# Patient Record
Sex: MALE | Race: WHITE | Employment: FULL TIME | URBAN - METROPOLITAN AREA
[De-identification: names, ages, dates, MRNs, and addresses within clinical notes are randomized per-mention and may not be internally consistent; named-entity substitution may affect disease eponyms.]

---

## 2017-07-19 ENCOUNTER — HOSPITAL ENCOUNTER (INPATIENT)
Age: 62
LOS: 5 days | Discharge: HOME | DRG: 690 | End: 2017-07-24
Attending: INTERNAL MEDICINE | Admitting: INTERNAL MEDICINE
Payer: COMMERCIAL

## 2017-07-19 PROBLEM — N20.0 KIDNEY STONES: Status: ACTIVE | Noted: 2017-07-19

## 2017-07-19 PROBLEM — E11.9 DIABETES MELLITUS TYPE 2, CONTROLLED (HCC): Status: ACTIVE | Noted: 2017-07-19

## 2017-07-19 PROBLEM — N20.0 RENAL STONES: Status: ACTIVE | Noted: 2017-07-19

## 2017-07-19 LAB — GLUCOSE BLD STRIP.AUTO-MCNC: 148 MG/DL (ref 70–110)

## 2017-07-19 PROCEDURE — 65270000029 HC RM PRIVATE

## 2017-07-19 PROCEDURE — 74011250636 HC RX REV CODE- 250/636: Performed by: INTERNAL MEDICINE

## 2017-07-19 PROCEDURE — 82962 GLUCOSE BLOOD TEST: CPT

## 2017-07-19 RX ORDER — NALOXONE HYDROCHLORIDE 0.4 MG/ML
0.4 INJECTION, SOLUTION INTRAMUSCULAR; INTRAVENOUS; SUBCUTANEOUS AS NEEDED
Status: DISCONTINUED | OUTPATIENT
Start: 2017-07-19 | End: 2017-07-24 | Stop reason: HOSPADM

## 2017-07-19 RX ORDER — INSULIN LISPRO 100 [IU]/ML
INJECTION, SOLUTION INTRAVENOUS; SUBCUTANEOUS
Status: DISCONTINUED | OUTPATIENT
Start: 2017-07-20 | End: 2017-07-24 | Stop reason: HOSPADM

## 2017-07-19 RX ORDER — ONDANSETRON 2 MG/ML
4 INJECTION INTRAMUSCULAR; INTRAVENOUS
Status: DISCONTINUED | OUTPATIENT
Start: 2017-07-19 | End: 2017-07-20 | Stop reason: SDUPTHER

## 2017-07-19 RX ORDER — HYDROMORPHONE HYDROCHLORIDE 1 MG/ML
0.5 INJECTION, SOLUTION INTRAMUSCULAR; INTRAVENOUS; SUBCUTANEOUS
Status: DISCONTINUED | OUTPATIENT
Start: 2017-07-19 | End: 2017-07-24 | Stop reason: HOSPADM

## 2017-07-19 RX ORDER — SODIUM CHLORIDE 9 MG/ML
75 INJECTION, SOLUTION INTRAVENOUS CONTINUOUS
Status: DISPENSED | OUTPATIENT
Start: 2017-07-20 | End: 2017-07-23

## 2017-07-19 RX ORDER — MAGNESIUM SULFATE 100 %
4 CRYSTALS MISCELLANEOUS AS NEEDED
Status: DISCONTINUED | OUTPATIENT
Start: 2017-07-19 | End: 2017-07-21 | Stop reason: SDUPTHER

## 2017-07-19 RX ORDER — TAMSULOSIN HYDROCHLORIDE 0.4 MG/1
0.4 CAPSULE ORAL DAILY
Status: DISCONTINUED | OUTPATIENT
Start: 2017-07-20 | End: 2017-07-20 | Stop reason: SDUPTHER

## 2017-07-19 RX ORDER — OXYCODONE AND ACETAMINOPHEN 5; 325 MG/1; MG/1
1 TABLET ORAL
Status: DISCONTINUED | OUTPATIENT
Start: 2017-07-19 | End: 2017-07-24 | Stop reason: HOSPADM

## 2017-07-19 RX ORDER — HEPARIN SODIUM 5000 [USP'U]/ML
5000 INJECTION, SOLUTION INTRAVENOUS; SUBCUTANEOUS EVERY 8 HOURS
Status: DISCONTINUED | OUTPATIENT
Start: 2017-07-20 | End: 2017-07-20

## 2017-07-19 RX ORDER — DEXTROSE 50 % IN WATER (D50W) INTRAVENOUS SYRINGE
25-50 AS NEEDED
Status: DISCONTINUED | OUTPATIENT
Start: 2017-07-19 | End: 2017-07-21 | Stop reason: SDUPTHER

## 2017-07-19 RX ADMIN — SODIUM CHLORIDE 75 ML/HR: 900 INJECTION, SOLUTION INTRAVENOUS at 23:59

## 2017-07-19 NOTE — IP AVS SNAPSHOT
[address] Patient: Coleen Boast MRN: [id number] Current Discharge Medication List  
  
START taking these medications Dose & Instructions Dispensing Information Comments Morning Noon Evening Bedtime  
 oxyCODONE-acetaminophen 5-325 mg per tablet Commonly known as:  PERCOCET Your last dose was: Your next dose is:    
   
   
 Dose:  1 Tab Take 1 Tab by mouth every four (4) hours as needed. Max Daily Amount: 6 Tabs. Quantity:  20 Tab Refills:  0  
     
   
   
   
  
 * rivaroxaban 15 mg Tab tablet Commonly known as:  Mammie Sports Your last dose was: Your next dose is:    
   
   
 Dose:  15 mg Take 1 Tab by mouth two (2) times daily (with meals). Quantity:  32 Tab Refills:  0  
     
   
   
   
  
 * rivaroxaban 20 mg Tab tablet Commonly known as:  Mammie Sports Your last dose was: Your next dose is:    
   
   
 Dose:  20 mg Take 1 Tab by mouth daily (with breakfast). Begin this prescription after 15 mg BID supply of medication is exhausted. Quantity:  30 Tab Refills:  2  
     
   
   
   
  
 * Notice: This list has 2 medication(s) that are the same as other medications prescribed for you. Read the directions carefully, and ask your doctor or other care provider to review them with you. CONTINUE these medications which have NOT CHANGED Dose & Instructions Dispensing Information Comments Morning Noon Evening Bedtime  
 fenofibrate 160 mg tablet Commonly known as:  LOFIBRA Your last dose was: Your next dose is:    
   
   
  Refills:  0 JANUMET XR 50-1,000 mg Tm24 Generic drug:  SITagliptin-metFORMIN Your last dose was: Your next dose is:    
   
   
  Refills:  5  
     
   
   
   
  
 losartan 25 mg tablet Commonly known as:  COZAAR Your last dose was: Your next dose is:    
   
   
  Refills:  0  
     
   
   
   
  
 tamsulosin 0.4 mg capsule Commonly known as:  FLOMAX Your last dose was: Your next dose is:    
   
   
 Dose:  0.4 mg Take 1 Cap by mouth daily (after dinner). Quantity:  40 Cap Refills:  0 Where to Get Your Medications Information on where to get these meds will be given to you by the nurse or doctor. ! Ask your nurse or doctor about these medications  
  oxyCODONE-acetaminophen 5-325 mg per tablet  
 rivaroxaban 15 mg Tab tablet  
 rivaroxaban 20 mg Tab tablet

## 2017-07-19 NOTE — IP AVS SNAPSHOT
303 [facility] 
 
 
 [address] Patient: Trae Torres MRN: [id number] [facility]:0/96/6900 You are allergic to the following No active allergies Recent Documentation Weight BMI Smoking Status 94.3 kg 28.2 kg/m2 Never Smoker Emergency Contacts Name Discharge Info Relation Home Work Mobile Soraya Elizondo DISCHARGE CAREGIVER [3] Spouse [3] [phone] About your hospitalization You were admitted on:  July 19, 2017 You last received care in the:  [facility] [address] You were discharged on:  July 24, 2017 Unit phone number:  [phone] Why you were hospitalized Your primary diagnosis was:  Not on File Your diagnoses also included:  Kidney Stones, Diabetes Mellitus Type 2, Controlled (Hcc), Renal Stones Providers Seen During Your Hospitalizations Provider Role Specialty Primary office phone Stefanie Amador MD Attending Provider Internal Medicine [phone] Your Primary Care Physician (PCP) Primary Care Physician Office Phone Office Fax Villa Metzger [phone] Follow-up Information Follow up With Details Comments Contact Info Sher Marques MD On 7/26/2017 @ 10:15 [address] [address] 
[phone] Radha Domingo MD   [address] [place] [address] [address] [place] [id number] 
[phone] Your Appointments Friday August 18, 2017  1:00 PM EDT Any with Ayana Armijo MD  
[facility] [address] ([facility]) [address]  
[phone] Current Discharge Medication List  
  
START taking these medications Dose & Instructions Dispensing Information Comments Morning Noon Evening Bedtime  
 oxyCODONE-acetaminophen 5-325 mg per tablet Commonly known as:  PERCOCET Your last dose was: Your next dose is:    
   
   
 Dose:  1 Tab Take 1 Tab by mouth every four (4) hours as needed. Max Daily Amount: 6 Tabs. Quantity:  20 Tab Refills:  0  
     
   
   
   
  
 * rivaroxaban 15 mg Tab tablet Commonly known as:  Shantelle Hinojosa Your last dose was: Your next dose is:    
   
   
 Dose:  15 mg Take 1 Tab by mouth two (2) times daily (with meals). Quantity:  32 Tab Refills:  0  
     
   
   
   
  
 * rivaroxaban 20 mg Tab tablet Commonly known as:  Shantelle Hinojosa Your last dose was: Your next dose is:    
   
   
 Dose:  20 mg Take 1 Tab by mouth daily (with breakfast). Begin this prescription after 15 mg BID supply of medication is exhausted. Quantity:  30 Tab Refills:  2  
     
   
   
   
  
 * Notice: This list has 2 medication(s) that are the same as other medications prescribed for you. Read the directions carefully, and ask your doctor or other care provider to review them with you. CONTINUE these medications which have NOT CHANGED Dose & Instructions Dispensing Information Comments Morning Noon Evening Bedtime  
 fenofibrate 160 mg tablet Commonly known as:  LOFIBRA Your last dose was: Your next dose is:    
   
   
  Refills:  0 JANUMET XR 50-1,000 mg Tm24 Generic drug:  SITagliptin-metFORMIN Your last dose was: Your next dose is:    
   
   
  Refills:  5  
     
   
   
   
  
 losartan 25 mg tablet Commonly known as:  COZAAR Your last dose was: Your next dose is:    
   
   
  Refills:  0  
     
   
   
   
  
 tamsulosin 0.4 mg capsule Commonly known as:  FLOMAX Your last dose was: Your next dose is:    
   
   
 Dose:  0.4 mg Take 1 Cap by mouth daily (after dinner). Quantity:  40 Cap Refills:  0 Where to Get Your Medications Information on where to get these meds will be given to you by the nurse or doctor. ! Ask your nurse or doctor about these medications  
  oxyCODONE-acetaminophen 5-325 mg per tablet  
 rivaroxaban 15 mg Tab tablet  
 rivaroxaban 20 mg Tab tablet Discharge Instructions Urinary Tract Infections in Men: Care Instructions Your Care Instructions A urinary tract infection, or UTI, is a general term for an infection anywhere between the kidneys and the tip of the penis. UTIs can also be a result of a prostate problem. Most cause pain or burning when you urinate. Most UTIs are caused by bacteria and can be cured with antibiotics. It is important to complete your treatment so that the infection does not get worse. Follow-up care is a key part of your treatment and safety. Be sure to make and go to all appointments, and call your doctor if you are having problems. It's also a good idea to know your test results and keep a list of the medicines you take. How can you care for yourself at home? · Take your antibiotics as prescribed. Do not stop taking them just because you feel better. You need to take the full course of antibiotics. · Take your medicines exactly as prescribed. Your doctor may have prescribed a medicine, such as phenazopyridine (Pyridium), to help relieve pain when you urinate. This turns your urine orange. You may stop taking it when your symptoms get better. But be sure to take all of your antibiotics, which treat the infection. · Drink extra water for the next day or two. This will help make the urine less concentrated and help wash out the bacteria causing the infection. (If you have kidney, heart, or liver disease and have to limit your fluids, talk with your doctor before you increase your fluid intake.) · Avoid drinks that are carbonated or have caffeine. They can irritate the bladder. · Urinate often. Try to empty your bladder each time.  
· To relieve pain, take a hot bath or lay a heating pad (set on low) over your lower belly or genital area. Never go to sleep with a heating pad in place. To help prevent UTIs · Drink plenty of fluids, enough so that your urine is light yellow or clear like water. If you have kidney, heart, or liver disease and have to limit fluids, talk with your doctor before you increase the amount of fluids you drink. · Urinate when you have the urge. Do not hold your urine for a long time. Urinate before you go to sleep. · Keep your penis clean. Catheter care If you have a drainage tube (catheter) in place, the following steps will help you care for it. · Always wash your hands before and after touching your catheter. · Check the area around the urethra for inflammation or signs of infection. Signs of infection include irritated, swollen, red, or tender skin, or pus around the catheter. · Clean the area around the catheter with soap and water two times a day. Dry with a clean towel afterward. · Do not apply powder or lotion to the skin around the catheter. To empty the urine collection bag · Wash your hands with soap and water. · Without touching the drain spout, remove the spout from its sleeve at the bottom of the collection bag. Open the valve on the spout. · Let the urine flow out of the bag and into the toilet or a container. Do not let the tubing or drain spout touch anything. · After you empty the bag, clean the end of the drain spout with tissue and water. Close the valve and put the drain spout back into its sleeve at the bottom of the collection bag. · Wash your hands with soap and water. When should you call for help? Call your doctor now or seek immediate medical care if: · Symptoms such as a fever, chills, nausea, or vomiting get worse or happen for the first time. · You have new pain in your back just below your rib cage. This is called flank pain. · There is new blood or pus in your urine. · You are not able to take or keep down your antibiotics. Watch closely for changes in your health, and be sure to contact your doctor if: 
· You are not getting better after taking an antibiotic for 2 days. · Your symptoms go away but then come back. Where can you learn more? Go to http://tamanna-darwin.info/. Enter G667 in the search box to learn more about \"Urinary Tract Infections in Men: Care Instructions. \" Current as of: November 28, 2016 Content Version: 11.3 © 9089-8441 [employer]. Care instructions adapted under license by Berst (which disclaims liability or warranty for this information). If you have questions about a medical condition or this instruction, always ask your healthcare professional. [address] any warranty or liability for your use of this information. Deep Vein Thrombosis: Care Instructions Your Care Instructions A deep vein thrombosis (DVT) is a blood clot in certain veins of the legs, pelvis, or arms. Blood clots in these veins need to be treated because they can get bigger, break loose, and travel through the bloodstream to the lungs. A blood clot in a lung can be life-threatening. The doctor may have given you a blood thinner (anticoagulant). A blood thinner can stop the blood clot from growing larger and prevent new clots from forming. You will need to take a blood thinner for 3 to 6 months or longer. The doctor has checked you carefully, but problems can develop later. If you notice any problems or new symptoms, get medical treatment right away. Follow-up care is a key part of your treatment and safety. Be sure to make and go to all appointments, and call your doctor if you are having problems. It's also a good idea to know your test results and keep a list of the medicines you take. How can you care for yourself at home? · Take your medicines exactly as prescribed. Call your doctor if you think you are having a problem with your medicine. · If you are taking a blood thinner, be sure you get instructions about how to take your medicine safely. Blood thinners can cause serious bleeding problems. · Wear compression stockings if your doctor recommends them. These stockings are tighter at the feet than on the legs. They may reduce pain and swelling in your legs. But there are different types of stockings, and they need to fit right. So your doctor will recommend what you need. · When you sit, use a pillow to raise the arm or leg that has the blood clot. Try to keep it above the level of your heart. When should you call for help? Call 911 anytime you think you may need emergency care. For example, call if: 
· You passed out (lost consciousness). · You have symptoms of a blood clot in your lung (called a pulmonary embolism). These include: 
¨ Sudden chest pain. ¨ Trouble breathing. ¨ Coughing up blood. Call your doctor now or seek immediate medical care if: 
· You have new or worse trouble breathing. · You are dizzy or lightheaded, or you feel like you may faint. · You have symptoms of a blood clot in your arm or leg. These may include: 
¨ Pain in the arm, calf, back of the knee, thigh, or groin. ¨ Redness and swelling in the arm, leg, or groin. Watch closely for changes in your health, and be sure to contact your doctor if: 
· You do not get better as expected. Where can you learn more? Go to http://tamanna-darwin.info/. Enter Z360 in the search box to learn more about \"Deep Vein Thrombosis: Care Instructions. \" Current as of: March 20, 2017 Content Version: 11.3 © 2634-7854 [employer]. Care instructions adapted under license by Kwicr (which disclaims liability or warranty for this information).  If you have questions about a medical condition or this instruction, always ask your healthcare professional. [address] any warranty or liability for your use of this information. Discharge Orders None Method CRMharGuÃ­a Local Announcement We are excited to announce that we are making your provider's discharge notes available to you in Unicorn Production. You will see these notes when they are completed and signed by the physician that discharged you from your recent hospital stay. If you have any questions or concerns about any information you see in Unicorn Production, please call the Health Information Department where you were seen or reach out to your Primary Care Provider for more information about your plan of care. Introducing [facility] [place] [facility]! [facility] introduces Unicorn Production patient portal. Now you can access parts of your medical record, email your doctor's office, and request medication refills online. 1. In your internet browser, go to [email or web address] 2. Click on the First Time User? Click Here link in the Sign In box. You will see the New Member Sign Up page. 3. Enter your Unicorn Production Access Code exactly as it appears below. You will not need to use this code after youve completed the sign-up process. If you do not sign up before the expiration date, you must request a new code. · Unicorn Production Access Code: [email or web address] Expires: 10/16/2017 12:44 PM 
 
4. Enter the last four digits of your Social Security Number (xxxx) and Date of Birth (mm/dd/yyyy) as indicated and click Submit. You will be taken to the next sign-up page. 5. Create a Unicorn Production ID. This will be your Unicorn Production login ID and cannot be changed, so think of one that is secure and easy to remember. 6. Create a Un[employer] Production password. You can change your password at any time. 7. Enter your Password Reset Question and Answer. This can be used at a later time if you forget your password. 8. Enter your e-mail address. You will receive e-mail notification when new information is available in [address] 9. Click Sign Up. You can now view and download portions of your medical record. 10. Click the Download Summary menu link to download a portable copy of your medical information. If you have questions, please visit the Frequently Asked Questions section of the Growt website. Remember, MyChart is NOT to be used for urgent needs. For medical emergencies, dial 911. Now available from your iPhone and Android! General Information Please provide this summary of care documentation to your next provider. Patient Signature:  ____________________________________________________________ Date:  ____________________________________________________________  
  
Paulette Oka Provider Signature:  ____________________________________________________________ Date:  ____________________________________________________________

## 2017-07-20 LAB
ALBUMIN SERPL BCP-MCNC: 3.6 G/DL (ref 3.4–5)
ALBUMIN/GLOB SERPL: 1.1 {RATIO} (ref 0.8–1.7)
ALP SERPL-CCNC: 58 U/L (ref 45–117)
ALT SERPL-CCNC: 33 U/L (ref 16–61)
ANION GAP BLD CALC-SCNC: 9 MMOL/L (ref 3–18)
APTT PPP: 135.7 SEC (ref 23–36.4)
APTT PPP: 29.9 SEC (ref 23–36.4)
APTT PPP: >180 SEC (ref 23–36.4)
AST SERPL W P-5'-P-CCNC: 16 U/L (ref 15–37)
BASOPHILS # BLD AUTO: 0 K/UL (ref 0–0.06)
BASOPHILS # BLD: 0 % (ref 0–2)
BILIRUB SERPL-MCNC: 0.6 MG/DL (ref 0.2–1)
BUN SERPL-MCNC: 21 MG/DL (ref 7–18)
BUN/CREAT SERPL: 13 (ref 12–20)
CALCIUM SERPL-MCNC: 9.8 MG/DL (ref 8.5–10.1)
CHLORIDE SERPL-SCNC: 107 MMOL/L (ref 100–108)
CO2 SERPL-SCNC: 24 MMOL/L (ref 21–32)
CREAT SERPL-MCNC: 1.59 MG/DL (ref 0.6–1.3)
DIFFERENTIAL METHOD BLD: ABNORMAL
EOSINOPHIL # BLD: 0 K/UL (ref 0–0.4)
EOSINOPHIL NFR BLD: 0 % (ref 0–5)
ERYTHROCYTE [DISTWIDTH] IN BLOOD BY AUTOMATED COUNT: 14.5 % (ref 11.6–14.5)
ERYTHROCYTE [DISTWIDTH] IN BLOOD BY AUTOMATED COUNT: 14.6 % (ref 11.6–14.5)
EST. AVERAGE GLUCOSE BLD GHB EST-MCNC: 123 MG/DL
GLOBULIN SER CALC-MCNC: 3.3 G/DL (ref 2–4)
GLUCOSE BLD STRIP.AUTO-MCNC: 137 MG/DL (ref 70–110)
GLUCOSE BLD STRIP.AUTO-MCNC: 183 MG/DL (ref 70–110)
GLUCOSE BLD STRIP.AUTO-MCNC: 184 MG/DL (ref 70–110)
GLUCOSE BLD STRIP.AUTO-MCNC: 214 MG/DL (ref 70–110)
GLUCOSE SERPL-MCNC: 198 MG/DL (ref 74–99)
HBA1C MFR BLD: 5.9 % (ref 4.2–5.6)
HCT VFR BLD AUTO: 39 % (ref 36–48)
HCT VFR BLD AUTO: 40.6 % (ref 36–48)
HGB BLD-MCNC: 13.4 G/DL (ref 13–16)
HGB BLD-MCNC: 14 G/DL (ref 13–16)
LYMPHOCYTES # BLD AUTO: 10 % (ref 21–52)
LYMPHOCYTES # BLD: 1 K/UL (ref 0.9–3.6)
MCH RBC QN AUTO: 29.2 PG (ref 24–34)
MCH RBC QN AUTO: 29.3 PG (ref 24–34)
MCHC RBC AUTO-ENTMCNC: 34.4 G/DL (ref 31–37)
MCHC RBC AUTO-ENTMCNC: 34.5 G/DL (ref 31–37)
MCV RBC AUTO: 84.6 FL (ref 74–97)
MCV RBC AUTO: 85.2 FL (ref 74–97)
MONOCYTES # BLD: 1.3 K/UL (ref 0.05–1.2)
MONOCYTES NFR BLD AUTO: 13 % (ref 3–10)
NEUTS SEG # BLD: 7.1 K/UL (ref 1.8–8)
NEUTS SEG NFR BLD AUTO: 77 % (ref 40–73)
PLATELET # BLD AUTO: 185 K/UL (ref 135–420)
PLATELET # BLD AUTO: 213 K/UL (ref 135–420)
PMV BLD AUTO: 9.3 FL (ref 9.2–11.8)
PMV BLD AUTO: 9.9 FL (ref 9.2–11.8)
POTASSIUM SERPL-SCNC: 4.8 MMOL/L (ref 3.5–5.5)
PROT SERPL-MCNC: 6.9 G/DL (ref 6.4–8.2)
RBC # BLD AUTO: 4.58 M/UL (ref 4.7–5.5)
RBC # BLD AUTO: 4.8 M/UL (ref 4.7–5.5)
SODIUM SERPL-SCNC: 140 MMOL/L (ref 136–145)
WBC # BLD AUTO: 11.8 K/UL (ref 4.6–13.2)
WBC # BLD AUTO: 9.4 K/UL (ref 4.6–13.2)

## 2017-07-20 PROCEDURE — 36415 COLL VENOUS BLD VENIPUNCTURE: CPT | Performed by: INTERNAL MEDICINE

## 2017-07-20 PROCEDURE — 93970 EXTREMITY STUDY: CPT

## 2017-07-20 PROCEDURE — 85730 THROMBOPLASTIN TIME PARTIAL: CPT | Performed by: FAMILY MEDICINE

## 2017-07-20 PROCEDURE — 74011250636 HC RX REV CODE- 250/636: Performed by: INTERNAL MEDICINE

## 2017-07-20 PROCEDURE — 74011250637 HC RX REV CODE- 250/637: Performed by: INTERNAL MEDICINE

## 2017-07-20 PROCEDURE — 82962 GLUCOSE BLOOD TEST: CPT

## 2017-07-20 PROCEDURE — 85025 COMPLETE CBC W/AUTO DIFF WBC: CPT | Performed by: FAMILY MEDICINE

## 2017-07-20 PROCEDURE — 74011250636 HC RX REV CODE- 250/636: Performed by: FAMILY MEDICINE

## 2017-07-20 PROCEDURE — 83036 HEMOGLOBIN GLYCOSYLATED A1C: CPT | Performed by: INTERNAL MEDICINE

## 2017-07-20 PROCEDURE — 85730 THROMBOPLASTIN TIME PARTIAL: CPT | Performed by: INTERNAL MEDICINE

## 2017-07-20 PROCEDURE — 80053 COMPREHEN METABOLIC PANEL: CPT | Performed by: INTERNAL MEDICINE

## 2017-07-20 PROCEDURE — 85027 COMPLETE CBC AUTOMATED: CPT | Performed by: INTERNAL MEDICINE

## 2017-07-20 PROCEDURE — 74011636637 HC RX REV CODE- 636/637: Performed by: INTERNAL MEDICINE

## 2017-07-20 PROCEDURE — 65270000029 HC RM PRIVATE

## 2017-07-20 RX ORDER — ONDANSETRON 2 MG/ML
4 INJECTION INTRAMUSCULAR; INTRAVENOUS
Status: DISCONTINUED | OUTPATIENT
Start: 2017-07-20 | End: 2017-07-24 | Stop reason: HOSPADM

## 2017-07-20 RX ORDER — HEPARIN SODIUM 1000 [USP'U]/ML
80 INJECTION, SOLUTION INTRAVENOUS; SUBCUTANEOUS ONCE
Status: COMPLETED | OUTPATIENT
Start: 2017-07-20 | End: 2017-07-20

## 2017-07-20 RX ORDER — HEPARIN SODIUM 10000 [USP'U]/100ML
18-36 INJECTION, SOLUTION INTRAVENOUS
Status: DISCONTINUED | OUTPATIENT
Start: 2017-07-20 | End: 2017-07-23

## 2017-07-20 RX ORDER — LOSARTAN POTASSIUM 25 MG/1
25 TABLET ORAL DAILY
Status: DISCONTINUED | OUTPATIENT
Start: 2017-07-20 | End: 2017-07-24 | Stop reason: HOSPADM

## 2017-07-20 RX ORDER — TAMSULOSIN HYDROCHLORIDE 0.4 MG/1
0.4 CAPSULE ORAL
Status: DISCONTINUED | OUTPATIENT
Start: 2017-07-20 | End: 2017-07-24 | Stop reason: HOSPADM

## 2017-07-20 RX ADMIN — ONDANSETRON 4 MG: 2 INJECTION INTRAMUSCULAR; INTRAVENOUS at 20:41

## 2017-07-20 RX ADMIN — TAMSULOSIN HYDROCHLORIDE 0.4 MG: 0.4 CAPSULE ORAL at 17:44

## 2017-07-20 RX ADMIN — HEPARIN SODIUM 5000 UNITS: 5000 INJECTION, SOLUTION INTRAVENOUS; SUBCUTANEOUS at 10:04

## 2017-07-20 RX ADMIN — ONDANSETRON 4 MG: 2 INJECTION INTRAMUSCULAR; INTRAVENOUS at 00:08

## 2017-07-20 RX ADMIN — OXYCODONE AND ACETAMINOPHEN 1 TABLET: 5; 325 TABLET ORAL at 10:04

## 2017-07-20 RX ADMIN — HEPARIN SODIUM AND DEXTROSE 18 UNITS/KG/HR: 10000; 5 INJECTION INTRAVENOUS at 15:57

## 2017-07-20 RX ADMIN — INSULIN LISPRO 2 UNITS: 100 INJECTION, SOLUTION INTRAVENOUS; SUBCUTANEOUS at 13:51

## 2017-07-20 RX ADMIN — HYDROMORPHONE HYDROCHLORIDE 0.5 MG: 1 INJECTION, SOLUTION INTRAMUSCULAR; INTRAVENOUS; SUBCUTANEOUS at 00:08

## 2017-07-20 RX ADMIN — OXYCODONE AND ACETAMINOPHEN 1 TABLET: 5; 325 TABLET ORAL at 04:05

## 2017-07-20 RX ADMIN — HEPARIN SODIUM 5000 UNITS: 5000 INJECTION, SOLUTION INTRAVENOUS; SUBCUTANEOUS at 00:09

## 2017-07-20 RX ADMIN — LOSARTAN POTASSIUM 25 MG: 25 TABLET ORAL at 10:04

## 2017-07-20 RX ADMIN — INSULIN LISPRO 2 UNITS: 100 INJECTION, SOLUTION INTRAVENOUS; SUBCUTANEOUS at 23:15

## 2017-07-20 RX ADMIN — OXYCODONE AND ACETAMINOPHEN 1 TABLET: 5; 325 TABLET ORAL at 20:41

## 2017-07-20 RX ADMIN — HEPARIN SODIUM 7540 UNITS: 1000 INJECTION, SOLUTION INTRAVENOUS; SUBCUTANEOUS at 15:25

## 2017-07-20 RX ADMIN — INSULIN LISPRO 4 UNITS: 100 INJECTION, SOLUTION INTRAVENOUS; SUBCUTANEOUS at 10:03

## 2017-07-20 NOTE — PROGRESS NOTES
conducted an initial consultation and Spiritual Assessment for Brian Monet, who is a 58 y. o.,male. Patients Primary Language is: [place]. According to the patients EMR Moravian Affiliation is: [place]. The reason the Patient came to the hospital is:   Patient Active Problem List    Diagnosis Date Noted    Kidney stones 07/19/2017    Diabetes mellitus type 2, controlled ([facility] [address] 07/19/2017    Renal stones 07/19/2017        The  provided the following Interventions:  Initiated a relationship of care and support. Explored issues of mark, belief, spirituality and Taoist/ritual needs while hospitalized. Listened empathically. Provided chaplaincy education. Provided information about Spiritual Care Services. Offered prayer and assurance of continued prayers on patient's behalf. Chart reviewed. The following outcomes where achieved:  Patient shared limited information about both their medical narrative and spiritual journey/beliefs.  assured patient of continued prayers.  left spiritual care kit with patient. Patient expressed gratitude for 's visit. Assessment:  Patient does not have any Taoist/cultural needs that will affect patients preferences in health care. There are no spiritual or Taoist issues which require intervention at this time. Plan:  Chaplains will continue to follow and will provide pastoral care on an as needed/requested basis.  recommends bedside caregivers page  on duty if patient shows signs of acute spiritual or emotional distress.     [address] [place] [address]   [phone]

## 2017-07-20 NOTE — PROGRESS NOTES
Pt received via transport from [facility] for acute care of dx kidney stones. Accompanied by family. Admitted to room 468 in [facility].

## 2017-07-20 NOTE — H&P
History & Physical    Patient: Ruth Alicia MRN: [id number]  CSN: [id number]    YOB: 1955  Age: 58 y.o. Sex: male      DOA: 7/19/2017    Chief Complaint: No chief complaint on file. Right sided flank pain        HPI:     Ruth Alicia is a 58 y.o.  male who transferred from [facility] in [facility] with chief complaint of worsening flank pain and worsening hesistancy and dysuria progressive over 2 days CT done showed 7.6 mm stone right ureter at the level of the superior pelvis with moderate hydronephrosis Patiient seen by urology yesterday was scheduled for lithotripsy in August pain became unbearable in ER given Morphine/ with minimal relief was instructed by on call urology to be transferred to [facility] for add on Procedure   Patient with recent knee surgery still in immobilizer no chest pain or shortnes of breath but also noted worsening welling left leg over last few days not moving much due to pain       Past Medical History:   Diagnosis Date    Diabetes ([address]     Gall stones     Hematuria     Hypertension     Kidney stone        Past Surgical History:   Procedure Laterality Date    HX COLONOSCOPY      HX OTHER SURGICAL      knee surgery       Family History   Problem Relation Age of Onset    Colon Cancer Mother        Social History     Social History    Marital status:      Spouse name: N/A    Number of children: N/A    Years of education: N/A     Social History Main Topics    Smoking status: Never Smoker    Smokeless tobacco: Never Used    Alcohol use No    Drug use: No    Sexual activity: Not Asked     Other Topics Concern    None     Social History Narrative       Prior to Admission medications    Medication Sig Start Date End Date Taking?  Authorizing Provider   fenofibrate (LOFIBRA) 160 mg tablet  6/24/17  Yes Historical Provider   losartan (COZAAR) 25 mg tablet  6/24/17  Yes Historical Provider   JANUMET XR 50-1,000 mg TM24  7/8/17  Yes Historical Provider tamsulosin (FLOMAX) 0.4 mg capsule Take 1 Cap by mouth daily (after dinner). 17  Yes Shante Steiner MD       No Known Allergies      Review of Systems  GENERAL: Patient alert, awake and oriented times 3, able to communicate full sentences and not in distress. HEENT: No change in vision, no earache, tinnitus, sore throat or sinus congestion. NECK: No pain or stiffness. PULMONARY: No shortness of breath, cough or wheeze. Cardiovascular: no pnd or orthopnea, no CP  GASTROINTESTINAL: No abdominal pain, nausea, vomiting or diarrhea, melena or bright red blood per rectum. GENITOURINARY:positive  urinary frequency, urgency, hesitancy or dysuria. And hematuria   MUSCULOSKELETAL: positive  Joint pain knee/ o muscle pain, positve back pain, no recent trauma. Hx of surgery recent Dr Dandre Lafleur: No rash, no itching, no lesions. ENDOCRINE: No polyuria, polydipsia, no heat or cold intolerance. No recent change in weight. HEMATOLOGICAL: No anemia or easy bruising or bleeding. NEUROLOGIC: No headache, seizures, numbness, tingling or weakness. Physical Exam:     Physical Exam:  Visit Vitals    /71 (BP 1 Location: Left arm, BP Patient Position: At rest)    Pulse 69    Temp 99 °F (37.2 °C)    Resp 18    Wt 94.3 kg (207 lb 14.4 oz)    SpO2 94%    BMI 28.2 kg/m2      O2 Device: Room air    Temp (24hrs), Av.4 °F (36.9 °C), Min:97.2 °F (36.2 °C), Max:99.1 °F (37.3 °C)         1901 -  0700  In: -   Out: 1 [Urine:1]    General:  Alert, cooperative, no distress, appears stated age. Head: Normocephalic, without obvious abnormality, atraumatic. Eyes:  Conjunctivae/corneas clear. PERRL, EOMs intact. Nose: Nares normal. No drainage or sinus tenderness. Neck: Supple, symmetrical, trachea midline, no adenopathy, thyroid: no enlargement, no carotid bruit and no JVD. Lungs:   Clear to auscultation bilaterally.    Heart:  Regular rate and rhythm, S1, S2 normal.     Abdomen: Soft, non-tender. Bowel sounds normal.    Extremities: Extremities normal, atraumatic, no cyanosis or edema. Pulses: 2+ and symmetric all extremities. Swelling noted left knee immbolizer present   Skin:  No rashes or lesions   Neurologic: AAOx3, No focal motor or sensory deficit. Labs Reviewed:    Lab results reviewed. For significant abnormal values and values requiring intervention, see assessment and plan. CT shows 7.5mm kidney stone ureter  Moderate hydroureter and hydronephrosis      Procedures/imaging: see electronic medical records for all procedures/Xrays and details which were not copied into this note but were reviewed prior to creation of Plan      Assessment/Plan     Active Problems:    Kidney stones (7/19/2017)   urlogy consult   Fluids  Pain control     Diabetes mellitus type 2, controlled ([address] (7/19/2017)  NPO  SSI  Hold oral   Left leg swelling Post recent surgery  Check venous ultrasound  UTI   cont Levaquin   CAD   Currently stable [facility]          DVT/GI Prophylaxis: Hep SQ    Discussed with patient at bedside about hospital admission and my plan care, who understood and agree with my plan care.     Fernanda Baer MD  7/20/2017 11:07 PM

## 2017-07-20 NOTE — PROGRESS NOTES
Care Management Interventions  PCP Verified by CM: Yes (DR. Ximena Tracey)  Palliative Care Consult (Criteria: CHF and RRAT>21): No  Reason for No Palliative Care Consult: Other (see comment) (NO ORDER)  Mode of Transport at Discharge: Other (see comment) (FAMILY)  Transition of Care Consult (CM Consult): Discharge Planning  Current Support Network: Lives with Spouse, Own Home, Family Lives Nearby  Confirm Follow Up Transport: Self  Plan discussed with Pt/Family/Caregiver: Yes (PT REPORTS PLAN IS TO [facility])  Discharge Location  Discharge Placement: Home with family assistance  Pt is a 58year old male, room 468 alone at this time. Pt was admitted due to a Kidney Stone. Pt is alert and oriented x 3. Pt states that his plan is to return home with family support. Pt wife will transport when ready for discharge. Pt states that he has insurance and will have his wife bring the information in for delivery to the registration office. Pt plan is to give this information to the [facility] Unit secretary. SW will continue to follow and provide assistance.

## 2017-07-20 NOTE — PROGRESS NOTES
[facility]  Progress Note    Patient: Sugar Orozco Age: 58 y.o. : 1955 MR#: [id number] SSN: [id number]  Date/Time: 2017 10:20 AM    Subjective/24-hour events:     No chest pain or SOB acutely. Assessment:   Acute DVT  Renal stone  UTI  DM 2  Recent RLE surgery - reported repair of tendon rupture  CAD hx    Plan:  Initiate heparin infusion given + PVL result. Monitor renal indices. Continue ABX and IVF as ordered. Urology evaluation - recs appreciated in advance. Continue knee immobilizer. PT eval once on [facility] therapy. Analgesia PRN. Case discussed with:  [x]Patient  []Family  [x]Nursing  []Case Management  DVT Prophylaxis:  []Lovenox  []Hep SQ  []SCDs  []Coumadin   []On Heparin gtt    Objective:   VS:   Visit Vitals    /77 (BP 1 Location: Right arm, BP Patient Position: At rest)    Pulse 94    Temp 99.1 °F (37.3 °C)    Resp 20    SpO2 94%      Tmax/24hrs: Temp (24hrs), Av.2 °F (36.8 °C), Min:97.2 °F (36.2 °C), Max:99.1 °F (37.3 °C)    Intake/Output Summary (Last 24 hours) at 17 1020  Last data filed at 17 0400   Gross per 24 hour   Intake                0 ml   Output                1 ml   Net               -1 ml       General:  In NAD. Nontoxic-appearing. Cardiovascular:  RRR. Pulmonary:  Clear, no wheezes. Effort nonlabored. GI:  Abdomen soft, NTTP. Extremities:  Warm, no ischemia. Additional:  Awake and alert. Moves extremities spontaneously.     Labs:    Recent Results (from the past 24 hour(s))   GLUCOSE, POC    Collection Time: 17 10:08 PM   Result Value Ref Range    Glucose (POC) 148 (H) 70 - 110 mg/dL   HEMOGLOBIN A1C WITH EAG    Collection Time: 17  3:29 AM   Result Value Ref Range    Hemoglobin A1c 5.9 (H) 4.2 - 5.6 %    Est. average glucose 123 mg/dL   CBC W/O DIFF    Collection Time: 17  3:29 AM   Result Value Ref Range    WBC 11.8 4.6 - 13.2 K/uL    RBC 4.80 4.70 - 5.50 M/uL    HGB 14.0 13.0 - 16.0 g/dL    HCT 40.6 36.0 - 48.0 %    MCV 84.6 74.0 - 97.0 FL    MCH 29.2 24.0 - 34.0 PG    MCHC 34.5 31.0 - 37.0 g/dL    RDW 14.5 11.6 - 14.5 %    PLATELET 465 011 - 021 K/uL    MPV 9.9 9.2 - 43.5 FL   METABOLIC PANEL, COMPREHENSIVE    Collection Time: 07/20/17  3:29 AM   Result Value Ref Range    Sodium 140 136 - 145 mmol/L    Potassium 4.8 3.5 - 5.5 mmol/L    Chloride 107 100 - 108 mmol/L    CO2 24 21 - 32 mmol/L    Anion gap 9 3.0 - 18 mmol/L    Glucose 198 (H) 74 - 99 mg/dL    BUN 21 (H) 7.0 - 18 MG/DL    Creatinine 1.59 (H) 0.6 - 1.3 MG/DL    BUN/Creatinine ratio 13 12 - 20      GFR est AA 54 (L) >60 ml/min/1.73m2    GFR est non-AA 44 (L) >60 ml/min/1.73m2    Calcium 9.8 8.5 - 10.1 MG/DL    Bilirubin, total 0.6 0.2 - 1.0 MG/DL    ALT (SGPT) 33 16 - 61 U/L    AST (SGOT) 16 15 - 37 U/L    Alk.  phosphatase 58 45 - 117 U/L    Protein, total 6.9 6.4 - 8.2 g/dL    Albumin 3.6 3.4 - 5.0 g/dL    Globulin 3.3 2.0 - 4.0 g/dL    A-G Ratio 1.1 0.8 - 1.7     GLUCOSE, POC    Collection Time: 07/20/17  8:24 AM   Result Value Ref Range    Glucose (POC) 214 (H) 70 - 110 mg/dL       Signed By: Percy Garcia MD     July 20, 2017 10:20 AM

## 2017-07-20 NOTE — PROCEDURES
[facility]  *** FINAL REPORT ***    Name: Marci Pritchard  MRN: [id number]    Inpatient  : 1955  HIS Order #: [id number]  [id number] [facility] Visit #: [id number]  Date: 2017    TYPE OF TEST: Peripheral Venous Testing    REASON FOR TEST  Pain in limb, Limb swelling    Right Leg:-  Deep venous thrombosis:           No  Superficial venous thrombosis:    No  Deep venous insufficiency:        Not examined  Superficial venous insufficiency: Not examined    Left Leg:-  Deep venous thrombosis:           Yes  Proximal extent of thrombus:      Common Femoral  Superficial venous thrombosis:    No  Deep venous insufficiency:        Not examined  Superficial venous insufficiency: Not examined      INTERPRETATION/FINDINGS  Duplex images were obtained using 2-D gray scale, color flow, and  spectral Doppler analysis. Right le. No evidence of deep venous thrombosis detected in the veins  visualized. 2. Deep veins visualized include the common femoral, femoral,  popliteal, posterior tibial and peroneal veins. 3. No evidence of superficial thrombosis detected. 4. Superficial veins visualized include the proximal great saphenous  vein. Left leg :  1. Acute occlusive deep venous thrombosis identified in the popliteal  vein. 2. Acute non-occlusive deep venous thrombosis identified in the common   femoral, and femoral veins. 3. Deep veins visualized include the common femoral, femoral,  popliteal, posterior tibial and peroneal veins. 4. No evidence of superficial thrombosis detected. 5. Superficial veins visualized include the proximal great saphenous  vein. ADDITIONAL COMMENTS  Results called to at [facility], [address] [place] [address] at 1009. I have personally reviewed the data relevant to the interpretation of  this  study. TECHNOLOGIST: Renee Espana RVT  Signed: 2017 10:10 AM    PHYSICIAN: Michail Sever.  Marita Monterroso MD  Signed: 2017 09:04 AM

## 2017-07-21 ENCOUNTER — ANESTHESIA (OUTPATIENT)
Dept: SURGERY | Age: 62
DRG: 690 | End: 2017-07-21
Payer: COMMERCIAL

## 2017-07-21 ENCOUNTER — ANESTHESIA EVENT (OUTPATIENT)
Dept: SURGERY | Age: 62
DRG: 690 | End: 2017-07-21
Payer: COMMERCIAL

## 2017-07-21 ENCOUNTER — APPOINTMENT (OUTPATIENT)
Dept: GENERAL RADIOLOGY | Age: 62
DRG: 690 | End: 2017-07-21
Attending: UROLOGY
Payer: COMMERCIAL

## 2017-07-21 LAB
ANION GAP BLD CALC-SCNC: 9 MMOL/L (ref 3–18)
APTT PPP: 104.9 SEC (ref 23–36.4)
APTT PPP: 75.5 SEC (ref 23–36.4)
APTT PPP: 77.3 SEC (ref 23–36.4)
BASOPHILS # BLD AUTO: 0 K/UL (ref 0–0.1)
BASOPHILS # BLD: 0 % (ref 0–2)
BUN SERPL-MCNC: 23 MG/DL (ref 7–18)
BUN/CREAT SERPL: 13 (ref 12–20)
CALCIUM SERPL-MCNC: 9.7 MG/DL (ref 8.5–10.1)
CHLORIDE SERPL-SCNC: 106 MMOL/L (ref 100–108)
CO2 SERPL-SCNC: 23 MMOL/L (ref 21–32)
CREAT SERPL-MCNC: 1.83 MG/DL (ref 0.6–1.3)
DIFFERENTIAL METHOD BLD: ABNORMAL
EOSINOPHIL # BLD: 0 K/UL (ref 0–0.4)
EOSINOPHIL NFR BLD: 0 % (ref 0–5)
ERYTHROCYTE [DISTWIDTH] IN BLOOD BY AUTOMATED COUNT: 14.5 % (ref 11.6–14.5)
GLUCOSE BLD STRIP.AUTO-MCNC: 138 MG/DL (ref 70–110)
GLUCOSE BLD STRIP.AUTO-MCNC: 138 MG/DL (ref 70–110)
GLUCOSE BLD STRIP.AUTO-MCNC: 160 MG/DL (ref 70–110)
GLUCOSE BLD STRIP.AUTO-MCNC: 170 MG/DL (ref 70–110)
GLUCOSE BLD STRIP.AUTO-MCNC: 182 MG/DL (ref 70–110)
GLUCOSE BLD STRIP.AUTO-MCNC: 204 MG/DL (ref 70–110)
GLUCOSE SERPL-MCNC: 190 MG/DL (ref 74–99)
HCT VFR BLD AUTO: 37.2 % (ref 36–48)
HGB BLD-MCNC: 12.7 G/DL (ref 13–16)
LYMPHOCYTES # BLD AUTO: 7 % (ref 21–52)
LYMPHOCYTES # BLD: 0.8 K/UL (ref 0.9–3.6)
MCH RBC QN AUTO: 29 PG (ref 24–34)
MCHC RBC AUTO-ENTMCNC: 34.1 G/DL (ref 31–37)
MCV RBC AUTO: 84.9 FL (ref 74–97)
MONOCYTES # BLD: 0.9 K/UL (ref 0.05–1.2)
MONOCYTES NFR BLD AUTO: 9 % (ref 3–10)
NEUTS SEG # BLD: 8.8 K/UL (ref 1.8–8)
NEUTS SEG NFR BLD AUTO: 84 % (ref 40–73)
PLATELET # BLD AUTO: 204 K/UL (ref 135–420)
PMV BLD AUTO: 9.9 FL (ref 9.2–11.8)
POTASSIUM SERPL-SCNC: 4.4 MMOL/L (ref 3.5–5.5)
RBC # BLD AUTO: 4.38 M/UL (ref 4.7–5.5)
SODIUM SERPL-SCNC: 138 MMOL/L (ref 136–145)
WBC # BLD AUTO: 10.5 K/UL (ref 4.6–13.2)

## 2017-07-21 PROCEDURE — 77030010509 HC AIRWY LMA MSK TELE -A: Performed by: NURSE ANESTHETIST, CERTIFIED REGISTERED

## 2017-07-21 PROCEDURE — 74011636637 HC RX REV CODE- 636/637: Performed by: FAMILY MEDICINE

## 2017-07-21 PROCEDURE — 85730 THROMBOPLASTIN TIME PARTIAL: CPT | Performed by: FAMILY MEDICINE

## 2017-07-21 PROCEDURE — 74420 UROGRAPHY RTRGR +-KUB: CPT

## 2017-07-21 PROCEDURE — 74011636637 HC RX REV CODE- 636/637: Performed by: NURSE ANESTHETIST, CERTIFIED REGISTERED

## 2017-07-21 PROCEDURE — 74011250636 HC RX REV CODE- 250/636

## 2017-07-21 PROCEDURE — BT1D1ZZ FLUOROSCOPY OF RIGHT KIDNEY, URETER AND BLADDER USING LOW OSMOLAR CONTRAST: ICD-10-PCS | Performed by: UROLOGY

## 2017-07-21 PROCEDURE — 0T768DZ DILATION OF RIGHT URETER WITH INTRALUMINAL DEVICE, VIA NATURAL OR ARTIFICIAL OPENING ENDOSCOPIC: ICD-10-PCS | Performed by: UROLOGY

## 2017-07-21 PROCEDURE — 77030020269 HC MISC IMPL: Performed by: UROLOGY

## 2017-07-21 PROCEDURE — 74011000250 HC RX REV CODE- 250

## 2017-07-21 PROCEDURE — 77030012012 HC AIRWY OP SFT TELE -A: Performed by: NURSE ANESTHETIST, CERTIFIED REGISTERED

## 2017-07-21 PROCEDURE — 74011000258 HC RX REV CODE- 258

## 2017-07-21 PROCEDURE — 76060000032 HC ANESTHESIA 0.5 TO 1 HR: Performed by: UROLOGY

## 2017-07-21 PROCEDURE — 74011636637 HC RX REV CODE- 636/637: Performed by: INTERNAL MEDICINE

## 2017-07-21 PROCEDURE — 65270000029 HC RM PRIVATE

## 2017-07-21 PROCEDURE — 77030019927 HC TBNG IRR CYSTO BAXT -A: Performed by: UROLOGY

## 2017-07-21 PROCEDURE — C1769 GUIDE WIRE: HCPCS | Performed by: UROLOGY

## 2017-07-21 PROCEDURE — 74011250637 HC RX REV CODE- 250/637: Performed by: INTERNAL MEDICINE

## 2017-07-21 PROCEDURE — 85730 THROMBOPLASTIN TIME PARTIAL: CPT | Performed by: INTERNAL MEDICINE

## 2017-07-21 PROCEDURE — 85025 COMPLETE CBC W/AUTO DIFF WBC: CPT | Performed by: FAMILY MEDICINE

## 2017-07-21 PROCEDURE — 77030005520 HC CATH URETH FOL38 BARD -A: Performed by: UROLOGY

## 2017-07-21 PROCEDURE — 74011250636 HC RX REV CODE- 250/636: Performed by: INTERNAL MEDICINE

## 2017-07-21 PROCEDURE — 76210000006 HC OR PH I REC 0.5 TO 1 HR: Performed by: UROLOGY

## 2017-07-21 PROCEDURE — 77030010545: Performed by: UROLOGY

## 2017-07-21 PROCEDURE — 77030020782 HC GWN BAIR PAWS FLX 3M -B: Performed by: UROLOGY

## 2017-07-21 PROCEDURE — 74011000250 HC RX REV CODE- 250: Performed by: ANESTHESIOLOGY

## 2017-07-21 PROCEDURE — 77030018832 HC SOL IRR H20 ICUM -A: Performed by: UROLOGY

## 2017-07-21 PROCEDURE — C1758 CATHETER, URETERAL: HCPCS | Performed by: UROLOGY

## 2017-07-21 PROCEDURE — 74011250636 HC RX REV CODE- 250/636: Performed by: FAMILY MEDICINE

## 2017-07-21 PROCEDURE — 77030018846 HC SOL IRR STRL H20 ICUM -A: Performed by: UROLOGY

## 2017-07-21 PROCEDURE — 87086 URINE CULTURE/COLONY COUNT: CPT | Performed by: INTERNAL MEDICINE

## 2017-07-21 PROCEDURE — 93005 ELECTROCARDIOGRAM TRACING: CPT

## 2017-07-21 PROCEDURE — 80048 BASIC METABOLIC PNL TOTAL CA: CPT | Performed by: FAMILY MEDICINE

## 2017-07-21 PROCEDURE — 76010000138 HC OR TIME 0.5 TO 1 HR: Performed by: UROLOGY

## 2017-07-21 PROCEDURE — 82962 GLUCOSE BLOOD TEST: CPT

## 2017-07-21 PROCEDURE — 87086 URINE CULTURE/COLONY COUNT: CPT | Performed by: FAMILY MEDICINE

## 2017-07-21 PROCEDURE — 36415 COLL VENOUS BLD VENIPUNCTURE: CPT | Performed by: FAMILY MEDICINE

## 2017-07-21 RX ORDER — SODIUM CHLORIDE 0.9 % (FLUSH) 0.9 %
5-10 SYRINGE (ML) INJECTION AS NEEDED
Status: DISCONTINUED | OUTPATIENT
Start: 2017-07-21 | End: 2017-07-21 | Stop reason: HOSPADM

## 2017-07-21 RX ORDER — SODIUM CHLORIDE 0.9 % (FLUSH) 0.9 %
5-10 SYRINGE (ML) INJECTION EVERY 8 HOURS
Status: DISCONTINUED | OUTPATIENT
Start: 2017-07-21 | End: 2017-07-21 | Stop reason: HOSPADM

## 2017-07-21 RX ORDER — ONDANSETRON 2 MG/ML
INJECTION INTRAMUSCULAR; INTRAVENOUS AS NEEDED
Status: DISCONTINUED | OUTPATIENT
Start: 2017-07-21 | End: 2017-07-21 | Stop reason: HOSPADM

## 2017-07-21 RX ORDER — DEXTROSE 50 % IN WATER (D50W) INTRAVENOUS SYRINGE
25-50 AS NEEDED
Status: DISCONTINUED | OUTPATIENT
Start: 2017-07-21 | End: 2017-07-21 | Stop reason: SDUPTHER

## 2017-07-21 RX ORDER — INSULIN LISPRO 100 [IU]/ML
INJECTION, SOLUTION INTRAVENOUS; SUBCUTANEOUS ONCE
Status: COMPLETED | OUTPATIENT
Start: 2017-07-21 | End: 2017-07-21

## 2017-07-21 RX ORDER — INSULIN LISPRO 100 [IU]/ML
INJECTION, SOLUTION INTRAVENOUS; SUBCUTANEOUS ONCE
Status: DISCONTINUED | OUTPATIENT
Start: 2017-07-21 | End: 2017-07-21 | Stop reason: HOSPADM

## 2017-07-21 RX ORDER — SODIUM CHLORIDE, SODIUM LACTATE, POTASSIUM CHLORIDE, CALCIUM CHLORIDE 600; 310; 30; 20 MG/100ML; MG/100ML; MG/100ML; MG/100ML
INJECTION, SOLUTION INTRAVENOUS
Status: DISCONTINUED | OUTPATIENT
Start: 2017-07-21 | End: 2017-07-21 | Stop reason: HOSPADM

## 2017-07-21 RX ORDER — CEFAZOLIN SODIUM 1 G/3ML
INJECTION, POWDER, FOR SOLUTION INTRAMUSCULAR; INTRAVENOUS AS NEEDED
Status: DISCONTINUED | OUTPATIENT
Start: 2017-07-21 | End: 2017-07-21 | Stop reason: HOSPADM

## 2017-07-21 RX ORDER — MIDAZOLAM HYDROCHLORIDE 1 MG/ML
INJECTION, SOLUTION INTRAMUSCULAR; INTRAVENOUS AS NEEDED
Status: DISCONTINUED | OUTPATIENT
Start: 2017-07-21 | End: 2017-07-21 | Stop reason: HOSPADM

## 2017-07-21 RX ORDER — MAGNESIUM SULFATE 100 %
4 CRYSTALS MISCELLANEOUS AS NEEDED
Status: DISCONTINUED | OUTPATIENT
Start: 2017-07-21 | End: 2017-07-21 | Stop reason: SDUPTHER

## 2017-07-21 RX ORDER — MAGNESIUM SULFATE 100 %
4 CRYSTALS MISCELLANEOUS AS NEEDED
Status: DISCONTINUED | OUTPATIENT
Start: 2017-07-21 | End: 2017-07-21 | Stop reason: HOSPADM

## 2017-07-21 RX ORDER — DEXTROSE 50 % IN WATER (D50W) INTRAVENOUS SYRINGE
25-50 AS NEEDED
Status: DISCONTINUED | OUTPATIENT
Start: 2017-07-21 | End: 2017-07-21 | Stop reason: HOSPADM

## 2017-07-21 RX ORDER — PROPOFOL 10 MG/ML
INJECTION, EMULSION INTRAVENOUS AS NEEDED
Status: DISCONTINUED | OUTPATIENT
Start: 2017-07-21 | End: 2017-07-21 | Stop reason: HOSPADM

## 2017-07-21 RX ORDER — CEFAZOLIN SODIUM 2 G/50ML
2 SOLUTION INTRAVENOUS ONCE
Status: DISCONTINUED | OUTPATIENT
Start: 2017-07-21 | End: 2017-07-21 | Stop reason: HOSPADM

## 2017-07-21 RX ORDER — FENTANYL CITRATE 50 UG/ML
INJECTION, SOLUTION INTRAMUSCULAR; INTRAVENOUS AS NEEDED
Status: DISCONTINUED | OUTPATIENT
Start: 2017-07-21 | End: 2017-07-21 | Stop reason: HOSPADM

## 2017-07-21 RX ORDER — FENTANYL CITRATE 50 UG/ML
25 INJECTION, SOLUTION INTRAMUSCULAR; INTRAVENOUS
Status: DISCONTINUED | OUTPATIENT
Start: 2017-07-21 | End: 2017-07-21 | Stop reason: HOSPADM

## 2017-07-21 RX ORDER — LIDOCAINE HYDROCHLORIDE 20 MG/ML
INJECTION, SOLUTION EPIDURAL; INFILTRATION; INTRACAUDAL; PERINEURAL AS NEEDED
Status: DISCONTINUED | OUTPATIENT
Start: 2017-07-21 | End: 2017-07-21 | Stop reason: HOSPADM

## 2017-07-21 RX ORDER — GLYCOPYRROLATE 0.2 MG/ML
INJECTION INTRAMUSCULAR; INTRAVENOUS AS NEEDED
Status: DISCONTINUED | OUTPATIENT
Start: 2017-07-21 | End: 2017-07-21 | Stop reason: HOSPADM

## 2017-07-21 RX ADMIN — INSULIN LISPRO 4 UNITS: 100 INJECTION, SOLUTION INTRAVENOUS; SUBCUTANEOUS at 08:14

## 2017-07-21 RX ADMIN — GLYCOPYRROLATE 0.2 MG: 0.2 INJECTION INTRAMUSCULAR; INTRAVENOUS at 17:08

## 2017-07-21 RX ADMIN — OXYCODONE AND ACETAMINOPHEN 1 TABLET: 5; 325 TABLET ORAL at 08:52

## 2017-07-21 RX ADMIN — LIDOCAINE HYDROCHLORIDE 40 MG: 20 INJECTION, SOLUTION EPIDURAL; INFILTRATION; INTRACAUDAL; PERINEURAL at 17:12

## 2017-07-21 RX ADMIN — OXYCODONE AND ACETAMINOPHEN 1 TABLET: 5; 325 TABLET ORAL at 05:00

## 2017-07-21 RX ADMIN — HEPARIN SODIUM AND DEXTROSE 16 UNITS/KG/HR: 10000; 5 INJECTION INTRAVENOUS at 07:51

## 2017-07-21 RX ADMIN — CEFAZOLIN SODIUM 2 G: 1 INJECTION, POWDER, FOR SOLUTION INTRAMUSCULAR; INTRAVENOUS at 17:11

## 2017-07-21 RX ADMIN — LOSARTAN POTASSIUM 25 MG: 25 TABLET ORAL at 08:14

## 2017-07-21 RX ADMIN — INSULIN LISPRO 3 UNITS: 100 INJECTION, SOLUTION INTRAVENOUS; SUBCUTANEOUS at 12:09

## 2017-07-21 RX ADMIN — SODIUM CHLORIDE, SODIUM LACTATE, POTASSIUM CHLORIDE, CALCIUM CHLORIDE: 600; 310; 30; 20 INJECTION, SOLUTION INTRAVENOUS at 17:45

## 2017-07-21 RX ADMIN — TAMSULOSIN HYDROCHLORIDE 0.4 MG: 0.4 CAPSULE ORAL at 19:06

## 2017-07-21 RX ADMIN — ONDANSETRON 4 MG: 2 INJECTION INTRAMUSCULAR; INTRAVENOUS at 17:52

## 2017-07-21 RX ADMIN — OXYCODONE AND ACETAMINOPHEN 1 TABLET: 5; 325 TABLET ORAL at 01:09

## 2017-07-21 RX ADMIN — PROPOFOL 150 MG: 10 INJECTION, EMULSION INTRAVENOUS at 17:12

## 2017-07-21 RX ADMIN — FAMOTIDINE 20 MG: 10 INJECTION, SOLUTION INTRAVENOUS at 15:56

## 2017-07-21 RX ADMIN — INSULIN LISPRO 3 UNITS: 100 INJECTION, SOLUTION INTRAVENOUS; SUBCUTANEOUS at 18:00

## 2017-07-21 RX ADMIN — MIDAZOLAM HYDROCHLORIDE 2 MG: 1 INJECTION, SOLUTION INTRAMUSCULAR; INTRAVENOUS at 17:08

## 2017-07-21 RX ADMIN — PROPOFOL 50 MG: 10 INJECTION, EMULSION INTRAVENOUS at 17:13

## 2017-07-21 RX ADMIN — FENTANYL CITRATE 50 MCG: 50 INJECTION, SOLUTION INTRAMUSCULAR; INTRAVENOUS at 17:13

## 2017-07-21 RX ADMIN — OXYCODONE AND ACETAMINOPHEN 1 TABLET: 5; 325 TABLET ORAL at 13:06

## 2017-07-21 RX ADMIN — SODIUM CHLORIDE, SODIUM LACTATE, POTASSIUM CHLORIDE, CALCIUM CHLORIDE: 600; 310; 30; 20 INJECTION, SOLUTION INTRAVENOUS at 17:07

## 2017-07-21 RX ADMIN — ONDANSETRON 4 MG: 2 INJECTION INTRAMUSCULAR; INTRAVENOUS at 05:00

## 2017-07-21 NOTE — ROUTINE PROCESS
Bedside and Verbal shift change report given to Wilfredo Mckeon RN (oncoming nurse) by [employer] RN (offgoing nurse). Report included the following information SBAR and Kardex.

## 2017-07-21 NOTE — PERIOP NOTES
Dr. Cris Hawkins stated do not need to administered insulin for blood sugar of 170 since patient covered at 12:09 for blood sugar of 182

## 2017-07-21 NOTE — PROGRESS NOTES
[facility]ist Group  Progress Note    Patient: Francheska Hurst Age: 58 y.o. : 1955 MR#: [id number] SSN: [id number]  Date/Time: 2017 10:02 AM    Subjective/24-hour events:     Quite comfortable currently - no significant pain, denies N/V. Afebrile overnight. Wife present at bedside. Assessment:   Acute DVT  Nephrolithiasis with suspected obstructive uropathy  UTI  DM 2  Recent RLE surgery   CAD hx    Plan:  Unfortunate mixup with regard to urology evaluation yesterday. Patient never made it to their inpatient census and they were unaware of admission. Will notify our PM of issue. Patient was made NPO around 845 this AM and I have discussed case with urology via phone. Assistance/recommendations appreciated. Will continue IV heparin for now - will need to hold if decision made to proceed with urologic procedure later today. Continue ABX and IVF as ordered. Urine culture ordered. Continue knee immobilizer. Analgesia PRN. Continue to follow labs - SCr slightly higher than yesterday. Case discussed with:  [x]Patient  [x]Family  []Nursing  []Case Management  DVT Prophylaxis:  []Lovenox  []Hep SQ  []SCDs  []Coumadin   [x]On Heparin gtt    Objective:   VS:   Visit Vitals    /71 (BP 1 Location: Left arm, BP Patient Position: At rest)    Pulse 75    Temp 97.8 °F (36.6 °C)    Resp 19    Wt 94.3 kg (207 lb 14.4 oz)    SpO2 95%    BMI 28.2 kg/m2      Tmax/24hrs: Temp (24hrs), Av.2 °F (36.8 °C), Min:97.3 °F (36.3 °C), Max:99 °F (37.2 °C)      Intake/Output Summary (Last 24 hours) at 17 1002  Last data filed at 17 0446   Gross per 24 hour   Intake                0 ml   Output              750 ml   Net             -750 ml       General:  In NAD. Nontoxic-appearing. Cardiovascular:  RRR. Pulmonary:  Clear, no wheezes. Effort nonlabored. GI:  Abdomen soft, NTTP. Extremities:  Warm, no ischemia. Additional:  Awake and alert.   Moves extremities spontaneously. Labs:    Recent Results (from the past 24 hour(s))   GLUCOSE, POC    Collection Time: 07/20/17 11:11 AM   Result Value Ref Range    Glucose (POC) 183 (H) 70 - 110 mg/dL   CBC WITH AUTOMATED DIFF    Collection Time: 07/20/17  2:29 PM   Result Value Ref Range    WBC 9.4 4.6 - 13.2 K/uL    RBC 4.58 (L) 4.70 - 5.50 M/uL    HGB 13.4 13.0 - 16.0 g/dL    HCT 39.0 36.0 - 48.0 %    MCV 85.2 74.0 - 97.0 FL    MCH 29.3 24.0 - 34.0 PG    MCHC 34.4 31.0 - 37.0 g/dL    RDW 14.6 (H) 11.6 - 14.5 %    PLATELET 989 393 - 454 K/uL    MPV 9.3 9.2 - 11.8 FL    NEUTROPHILS 77 (H) 40 - 73 %    LYMPHOCYTES 10 (L) 21 - 52 %    MONOCYTES 13 (H) 3 - 10 %    EOSINOPHILS 0 0 - 5 %    BASOPHILS 0 0 - 2 %    ABS. NEUTROPHILS 7.1 1.8 - 8.0 K/UL    ABS. LYMPHOCYTES 1.0 0.9 - 3.6 K/UL    ABS. MONOCYTES 1.3 (H) 0.05 - 1.2 K/UL    ABS. EOSINOPHILS 0.0 0.0 - 0.4 K/UL    ABS.  BASOPHILS 0.0 0.0 - 0.06 K/UL    DF AUTOMATED     PTT    Collection Time: 07/20/17  2:29 PM   Result Value Ref Range    aPTT 29.9 23.0 - 36.4 SEC   GLUCOSE, POC    Collection Time: 07/20/17  4:19 PM   Result Value Ref Range    Glucose (POC) 137 (H) 70 - 110 mg/dL   PTT    Collection Time: 07/20/17  8:20 PM   Result Value Ref Range    aPTT >180.0 (HH) 23.0 - 36.4 SEC   PTT    Collection Time: 07/20/17 10:46 PM   Result Value Ref Range    aPTT 135.7 (H) 23.0 - 36.4 SEC   GLUCOSE, POC    Collection Time: 07/20/17 11:15 PM   Result Value Ref Range    Glucose (POC) 184 (H) 70 - 110 mg/dL   PTT    Collection Time: 07/21/17  2:54 AM   Result Value Ref Range    aPTT 104.9 (H) 23.0 - 52.9 SEC   METABOLIC PANEL, BASIC    Collection Time: 07/21/17  2:54 AM   Result Value Ref Range    Sodium 138 136 - 145 mmol/L    Potassium 4.4 3.5 - 5.5 mmol/L    Chloride 106 100 - 108 mmol/L    CO2 23 21 - 32 mmol/L    Anion gap 9 3.0 - 18 mmol/L    Glucose 190 (H) 74 - 99 mg/dL    BUN 23 (H) 7.0 - 18 MG/DL    Creatinine 1.83 (H) 0.6 - 1.3 MG/DL    BUN/Creatinine ratio 13 12 - 20      GFR est AA 46 (L) >60 ml/min/1.73m2    GFR est non-AA 38 (L) >60 ml/min/1.73m2    Calcium 9.7 8.5 - 10.1 MG/DL   CBC WITH AUTOMATED DIFF    Collection Time: 07/21/17  2:54 AM   Result Value Ref Range    WBC 10.5 4.6 - 13.2 K/uL    RBC 4.38 (L) 4.70 - 5.50 M/uL    HGB 12.7 (L) 13.0 - 16.0 g/dL    HCT 37.2 36.0 - 48.0 %    MCV 84.9 74.0 - 97.0 FL    MCH 29.0 24.0 - 34.0 PG    MCHC 34.1 31.0 - 37.0 g/dL    RDW 14.5 11.6 - 14.5 %    PLATELET 353 008 - 193 K/uL    MPV 9.9 9.2 - 11.8 FL    NEUTROPHILS 84 (H) 40 - 73 %    LYMPHOCYTES 7 (L) 21 - 52 %    MONOCYTES 9 3 - 10 %    EOSINOPHILS 0 0 - 5 %    BASOPHILS 0 0 - 2 %    ABS. NEUTROPHILS 8.8 (H) 1.8 - 8.0 K/UL    ABS. LYMPHOCYTES 0.8 (L) 0.9 - 3.6 K/UL    ABS. MONOCYTES 0.9 0.05 - 1.2 K/UL    ABS. EOSINOPHILS 0.0 0.0 - 0.4 K/UL    ABS.  BASOPHILS 0.0 0.0 - 0.1 K/UL    DF AUTOMATED     GLUCOSE, POC    Collection Time: 07/21/17  8:11 AM   Result Value Ref Range    Glucose (POC) 204 (H) 70 - 110 mg/dL       Signed By: Bright Wu MD     July 21, 2017 10:02 AM

## 2017-07-21 NOTE — PROGRESS NOTES
TRANSFER - IN REPORT:    Verbal report received from Oleg Low RN(name) on Marisela Rios  being received from PACU(unit) for routine progression of care      Report consisted of patients Situation, Background, Assessment and   Recommendations(SBAR). Information from the following report(s) SBAR, OR Summary and MAR was reviewed with the receiving nurse. Opportunity for questions and clarification was provided. Assessment completed upon patients arrival to unit and care assumed.

## 2017-07-21 NOTE — DIABETES MGMT
Glycemic Control Plan of Care    POC BG range on 07/20/2017: 137-214 mg/dL. POC BG report on 07/21/2017 at time of review: 204, 182 mg/dL. Patient stated that he is only taking Janumet at home for his diabetes. Explained in patient glycemic management with patient with use of correctional insulin. He verbalized undestanding. Recommendation(s):   1.) Continue monitoring and consider adding basal lantus insulin 10 units daily if POC BG values remain above target range. Assessment:  Patient is 58year old with past medical history including type 2 diabetes mellitus, hypertension, gallstones, kidney stone, recent knee surgery and still in immobilizer - was admitted on 07/19/2017 with c/o worsening flank pain and worsening hesitancy, dysuria, and swelling of left leg. Noted:  Acute DVT. Nephrolithiasis with suspected obstructive uropathy. UTI. Type 2 diabetes mellitus with current A1C of 5.9% (07/20/2017). Most recent blood glucose values:    Results for Jeremias Gross (MRN [id number]) as of 7/21/2017 14:36   Ref. Range 7/20/2017 08:24 7/20/2017 11:11 7/20/2017 16:19 7/20/2017 23:15   GLUCOSE,FAST - POC Latest Ref Range: 70 - 110 mg/dL 214 (H) 183 (H) 137 (H) 184 (H)     Results for Jeremias Gross (MRN [id number]) as of 7/21/2017 14:36   Ref. Range 7/21/2017 08:11 7/21/2017 12:08   GLUCOSE,FAST - POC Latest Ref Range: 70 - 110 mg/dL 204 (H) 182 (H)     Current A1C: 5.9% (07/20/2017) is equivalent to average blood glucose of 123 mg/dL during the past 2-3 months. Current hospital diabetes medications:  Correctional lispro insulin ACHS. Very resistant dose. Total daily dose insulin requirement previous day:  07/20/2017  Lispro: 8 units    Home diabetes medications: As stated by patient on 07/21/2017  Janumet  mg daily. Diet: Diabetic consistent carb regular. Goals:  Blood glucose will be within target range of  mg/dL by 07/24/2017.      Education:  ___  Refer to Diabetes Education Record             _X__  Education not indicated at this time: 07/21/2017: Patient stated that he is knowledgeable about diabetes and following the recommended treatment plan.     Mk Mayfield RN

## 2017-07-21 NOTE — CONSULTS
Urology Consult Note    Patient: Ruth Alicia               Sex: male          DOA: 7/19/2017         YOB: 1955      Age:  58 y.o.        LOS:  LOS: 2 days              Referring physician: Cris Heller  Reason for consult: right ureteral stone      Assessment   This is a 58 y.o. male with -  1) Acute renal colic from 7 mm right ureteral stone  2) NADIRA 2/2 to 1.  3) DVT on heparin drip  Plan   1. OR this afternoon for cysto, right JJ stent placement, right retrograde pyelogram.   OK to continue heparin. 2. Keep NPO.       HPI:     Ruth Alicia is a 58 y.o. male who has been is currently admitted with  Acute renal colic. Previously eval by Dr. Marv Spangler, planned for URS. Acute rt flank pain presented to local ED. CT A/P revealed 7 mm mid ureteral stone. No fevers, no chill, no nausea or vomiting. Transferred to . Renal insufficiency on admission with rising SCr by today. No prior urologic surgery. On heparin drip for acute DVT in LLE    Past Medical History:   Diagnosis Date    Diabetes ([address]     Gall stones     Hematuria     Hypertension     Kidney stone        Past Surgical History:   Procedure Laterality Date    HX COLONOSCOPY      HX OTHER SURGICAL      knee surgery       Family History   Problem Relation Age of Onset    Colon Cancer Mother        Social History     Social History    Marital status:      Spouse name: N/A    Number of children: N/A    Years of education: N/A     Social History Main Topics    Smoking status: Never Smoker    Smokeless tobacco: Never Used    Alcohol use No    Drug use: No    Sexual activity: Not Asked     Other Topics Concern    None     Social History Narrative       Prior to Admission medications    Medication Sig Start Date End Date Taking?  Authorizing Provider   fenofibrate (LOFIBRA) 160 mg tablet  6/24/17  Yes Historical Provider   losartan (COZAAR) 25 mg tablet  6/24/17  Yes Historical Provider   JANUMET XR 50-1,000 mg TM24 7/8/17  Yes Historical Provider   tamsulosin (FLOMAX) 0.4 mg capsule Take 1 Cap by mouth daily (after dinner). 7/18/17  Yes Joey Landin MD       No Known Allergies    Review of Systems: reviewed and unchanged for ROS form Dr. Goldsmith March note 7/19/17. Physical Exam:      Visit Vitals    /71 (BP 1 Location: Left arm, BP Patient Position: At rest)    Pulse 71    Temp 97.5 °F (36.4 °C)    Resp 18    Wt 207 lb 14.4 oz (94.3 kg)    SpO2 96%    BMI 28.2 kg/m2       Physical Exam:   GENERAL: alert, cooperative, no distress, appears stated age  LUNG: unlabored breathing  ABDOMEN: soft, non-tender. Bowel sounds normal. No masses,  no organomegaly  EXTREMITIES:  extremities normal, atraumatic, no cyanosis or edema  SKIN: no jaundice  : + rt CVA tenderness      Lab/Data Review:  BMP: Lab Results   Component Value Date/Time     07/21/2017 02:54 AM    K 4.4 07/21/2017 02:54 AM     07/21/2017 02:54 AM    CO2 23 07/21/2017 02:54 AM    AGAP 9 07/21/2017 02:54 AM     (H) 07/21/2017 02:54 AM    BUN 23 (H) 07/21/2017 02:54 AM    CREA 1.83 (H) 07/21/2017 02:54 AM    GFRAA 46 (L) 07/21/2017 02:54 AM    GFRNA 38 (L) 07/21/2017 02:54 AM     CBC: Lab Results   Component Value Date/Time    WBC 10.5 07/21/2017 02:54 AM    HGB 12.7 (L) 07/21/2017 02:54 AM    HCT 37.2 07/21/2017 02:54 AM     07/21/2017 02:54 AM     COAGS: Lab Results   Component Value Date/Time    APTT 77.3 (H) 07/21/2017 09:20 AM          CT Results:  CT A/P reviewed from [facility] . 7 mm right mid ureteral stone with hydronephrosis. I reviewed the films. Results from Office Visit encounter on 07/18/17   CT CHEST ABD PELV W WO CONT    US Results:  No results found for this or any previous visit.     Christian Carreno MD    Pager: [phone]  Office: [phone]

## 2017-07-21 NOTE — ROUTINE PROCESS
TRANSFER - OUT REPORT:    Verbal report given to Amaury Cowart on jT Groves  being transferred to room 468 for routine progression of care       Report consisted of patients Situation, Background, Assessment and   Recommendations(SBAR). Information from the following report(s) SBAR, OR Summary, Intake/Output, MAR and Recent Results was reviewed with the receiving nurse. Opportunity for questions and clarification was provided.       Patient transported with:   O2 @ 3 liters  Registered Nurse  [facility]

## 2017-07-21 NOTE — ANESTHESIA PREPROCEDURE EVALUATION
Anesthetic History   No history of anesthetic complications            Review of Systems / Medical History  Patient summary reviewed, nursing notes reviewed and pertinent labs reviewed    Pulmonary  Within defined limits                 Neuro/Psych   Within defined limits           Cardiovascular    Hypertension: well controlled              Exercise tolerance: >4 METS     GI/Hepatic/Renal  Within defined limits              Endo/Other    Diabetes: well controlled, type 2         Other Findings   Comments: Risk Factors for Postoperative nausea/vomiting:       History of postoperative nausea/vomiting? NO       Female? NO       Motion sickness? NO       Intended opioid administration for postoperative analgesia? NO      Smoking Abstinence  Current Smoker? NO  Elective Surgery? NO  Seen preoperatively by anesthesiologist or proxy prior to day of surgery? YES  Pt abstained from smoking 24 hours prior to anesthesia?  N/A           Physical Exam    Airway  Mallampati: III  TM Distance: 4 - 6 cm  Neck ROM: short neck   Mouth opening: Diminished (comment)     Cardiovascular  Regular rate and rhythm,  S1 and S2 normal,  no murmur, click, rub, or gallop             Dental    Dentition: Poor dentition     Pulmonary  Breath sounds clear to auscultation               Abdominal  GI exam deferred       Other Findings            Anesthetic Plan    ASA: 3, emergent  Anesthesia type: general and MAC          Induction: Intravenous  Anesthetic plan and risks discussed with: Patient and Family

## 2017-07-21 NOTE — ROUTINE PROCESS
TRANSFER - IN REPORT:    Verbal report received from [facility] on Marisela Rios  being received from [facility](unit) for routine progression of care      Report consisted of patients Situation, Background, Assessment and   Recommendations(SBAR). Information from the following report(s) SBAR was reviewed with the receiving nurse. Opportunity for questions and clarification was provided. Assessment completed upon patients arrival to unit and care assumed.

## 2017-07-21 NOTE — OP NOTES
Operative Note      Patient: Jose Samayoa               Sex: male             MRN: [id number]      YOB: 1955      Age:  58 y.o. Preoperative Diagnosis: right ureteral stone with colic    Postoperative Diagnosis:  right ureteral stone    Surgeon: Stacia Christensen    Anesthesia:  General    Procedure:    1) Cystoscopy  2) right retrograde pyelogram  3) right ureteral stent placement  4) < 1 hour physician fluoroscopy imaging interpretation time    Operative Indication: This is a 58 y.o. male with a history of right urolithiasis. Their stone burden included 7 mm right midureteral stone. He is admitted to [facility] with renal colic and NADIRA. After the risks, benefits, alternatives, and complications were discussed they present now for operative management. Procedure in Detail: The patient was brought to the operative suite. Anesthesia was induced and preoperative antibiotics were administered. They were then placed in the dorsal lithotomy position and their external genitalia was prepped and draped in the usual fashion. A surgical timeout was performed confirming the patient's name, date of birth, laterality, and antibiotics. All were in agreement. A 22 Kazakh cystourethroscope was then inserted into the patients bladder. The urethra revealed no abnormalities. There was a blood clot noted within the bladder. No other bladder abnormalities were noted on cystoscopy. A sensor wire was then passed up the right ureteral orifice and up the kidney using fluoroscopic guidance. A 5 Kazakh open ended catheter was placed over the wire and the wire removed. A gentle retrograde pyelogram was performed opacifying the right renal pelvis. No filling defects were noted. There was severe right hydroureteronephrosis. The stone was noted in the distal ureter at the level of the pelvic brim. Hydronephrotic drip was noted and urine was sent for culture from the renal pelvis.   The sensor wire was replaced and the ureteral catheter was removed. A 6 fr x 28 cm stent was then placed in the standard fashion over our sensor wire. Excellent coil was noted in the kidney and bladder on fluoroscopy. A monzon catheter was then placed with 10 cc of sterile water in the balloon. The patient was then awoken and transferred to the recovery room in stable condition. Estimated Blood Loss: 2 cc                    Implants:   Implant Name Type Inv. Item Serial No.  Lot No. LRB No. Used Action   BARD INLAY OPTMA [id number]       [employer] [id number] Right 1 Implanted       Specimens:   ID Type Source Tests Collected by Time Destination   1 : urine from right kidney Urine Kidney, Right CULTURE, URINE Regina Santana MD 7/21/2017  5:43 PM Microbiology        Drains: None           Complications:  None    Dispo: The patient will be readmitted to the floor. We will keep a monzon catheter in for now to allow complete decompression in hopes of renal recovery.             Regina Santana MD  7/21/2017

## 2017-07-22 LAB
ANION GAP BLD CALC-SCNC: 8 MMOL/L (ref 3–18)
APTT PPP: 103.9 SEC (ref 23–36.4)
APTT PPP: 111.1 SEC (ref 23–36.4)
APTT PPP: 58.3 SEC (ref 23–36.4)
APTT PPP: 92.1 SEC (ref 23–36.4)
ATRIAL RATE: 78 BPM
BACTERIA SPEC CULT: NORMAL
BASOPHILS # BLD AUTO: 0 K/UL (ref 0–0.06)
BASOPHILS # BLD: 1 % (ref 0–2)
BUN SERPL-MCNC: 19 MG/DL (ref 7–18)
BUN/CREAT SERPL: 12 (ref 12–20)
CALCIUM SERPL-MCNC: 10.1 MG/DL (ref 8.5–10.1)
CALCULATED P AXIS, ECG09: 55 DEGREES
CALCULATED R AXIS, ECG10: 49 DEGREES
CALCULATED T AXIS, ECG11: 67 DEGREES
CHLORIDE SERPL-SCNC: 106 MMOL/L (ref 100–108)
CO2 SERPL-SCNC: 24 MMOL/L (ref 21–32)
CREAT SERPL-MCNC: 1.6 MG/DL (ref 0.6–1.3)
DIAGNOSIS, 93000: NORMAL
DIFFERENTIAL METHOD BLD: ABNORMAL
EOSINOPHIL # BLD: 0.1 K/UL (ref 0–0.4)
EOSINOPHIL NFR BLD: 1 % (ref 0–5)
ERYTHROCYTE [DISTWIDTH] IN BLOOD BY AUTOMATED COUNT: 14.3 % (ref 11.6–14.5)
GLUCOSE BLD STRIP.AUTO-MCNC: 147 MG/DL (ref 70–110)
GLUCOSE BLD STRIP.AUTO-MCNC: 151 MG/DL (ref 70–110)
GLUCOSE BLD STRIP.AUTO-MCNC: 196 MG/DL (ref 70–110)
GLUCOSE BLD STRIP.AUTO-MCNC: 221 MG/DL (ref 70–110)
GLUCOSE SERPL-MCNC: 137 MG/DL (ref 74–99)
HCT VFR BLD AUTO: 36.6 % (ref 36–48)
HGB BLD-MCNC: 12.8 G/DL (ref 13–16)
LYMPHOCYTES # BLD AUTO: 13 % (ref 21–52)
LYMPHOCYTES # BLD: 1 K/UL (ref 0.9–3.6)
MCH RBC QN AUTO: 29.4 PG (ref 24–34)
MCHC RBC AUTO-ENTMCNC: 35 G/DL (ref 31–37)
MCV RBC AUTO: 84.1 FL (ref 74–97)
MONOCYTES # BLD: 0.9 K/UL (ref 0.05–1.2)
MONOCYTES NFR BLD AUTO: 13 % (ref 3–10)
NEUTS SEG # BLD: 5.3 K/UL (ref 1.8–8)
NEUTS SEG NFR BLD AUTO: 72 % (ref 40–73)
P-R INTERVAL, ECG05: 150 MS
PLATELET # BLD AUTO: 180 K/UL (ref 135–420)
PMV BLD AUTO: 9.5 FL (ref 9.2–11.8)
POTASSIUM SERPL-SCNC: 3.8 MMOL/L (ref 3.5–5.5)
Q-T INTERVAL, ECG07: 358 MS
QRS DURATION, ECG06: 80 MS
QTC CALCULATION (BEZET), ECG08: 408 MS
RBC # BLD AUTO: 4.35 M/UL (ref 4.7–5.5)
SERVICE CMNT-IMP: NORMAL
SODIUM SERPL-SCNC: 138 MMOL/L (ref 136–145)
VENTRICULAR RATE, ECG03: 78 BPM
WBC # BLD AUTO: 7.3 K/UL (ref 4.6–13.2)

## 2017-07-22 PROCEDURE — 77010033678 HC OXYGEN DAILY

## 2017-07-22 PROCEDURE — 80048 BASIC METABOLIC PNL TOTAL CA: CPT | Performed by: FAMILY MEDICINE

## 2017-07-22 PROCEDURE — 74011250636 HC RX REV CODE- 250/636: Performed by: INTERNAL MEDICINE

## 2017-07-22 PROCEDURE — 74011636637 HC RX REV CODE- 636/637: Performed by: FAMILY MEDICINE

## 2017-07-22 PROCEDURE — 85730 THROMBOPLASTIN TIME PARTIAL: CPT | Performed by: FAMILY MEDICINE

## 2017-07-22 PROCEDURE — 74011250636 HC RX REV CODE- 250/636: Performed by: FAMILY MEDICINE

## 2017-07-22 PROCEDURE — 65270000029 HC RM PRIVATE

## 2017-07-22 PROCEDURE — 74011250637 HC RX REV CODE- 250/637: Performed by: INTERNAL MEDICINE

## 2017-07-22 PROCEDURE — 85025 COMPLETE CBC W/AUTO DIFF WBC: CPT | Performed by: FAMILY MEDICINE

## 2017-07-22 PROCEDURE — 36415 COLL VENOUS BLD VENIPUNCTURE: CPT | Performed by: FAMILY MEDICINE

## 2017-07-22 PROCEDURE — 82962 GLUCOSE BLOOD TEST: CPT

## 2017-07-22 PROCEDURE — 85730 THROMBOPLASTIN TIME PARTIAL: CPT | Performed by: INTERNAL MEDICINE

## 2017-07-22 RX ORDER — HEPARIN SODIUM 1000 [USP'U]/ML
40 INJECTION, SOLUTION INTRAVENOUS; SUBCUTANEOUS ONCE
Status: COMPLETED | OUTPATIENT
Start: 2017-07-22 | End: 2017-07-22

## 2017-07-22 RX ADMIN — HEPARIN SODIUM 3770 UNITS: 1000 INJECTION, SOLUTION INTRAVENOUS; SUBCUTANEOUS at 16:50

## 2017-07-22 RX ADMIN — HEPARIN SODIUM AND DEXTROSE 16 UNITS/KG/HR: 10000; 5 INJECTION INTRAVENOUS at 19:40

## 2017-07-22 RX ADMIN — TAMSULOSIN HYDROCHLORIDE 0.4 MG: 0.4 CAPSULE ORAL at 17:29

## 2017-07-22 RX ADMIN — HEPARIN SODIUM AND DEXTROSE 16 UNITS/KG/HR: 10000; 5 INJECTION INTRAVENOUS at 02:31

## 2017-07-22 RX ADMIN — INSULIN LISPRO 6 UNITS: 100 INJECTION, SOLUTION INTRAVENOUS; SUBCUTANEOUS at 12:41

## 2017-07-22 RX ADMIN — HEPARIN SODIUM AND DEXTROSE 16 UNITS/KG/HR: 10000; 5 INJECTION INTRAVENOUS at 16:52

## 2017-07-22 RX ADMIN — HEPARIN SODIUM AND DEXTROSE 14 UNITS/KG/HR: 10000; 5 INJECTION INTRAVENOUS at 09:11

## 2017-07-22 RX ADMIN — INSULIN LISPRO 3 UNITS: 100 INJECTION, SOLUTION INTRAVENOUS; SUBCUTANEOUS at 17:28

## 2017-07-22 RX ADMIN — INSULIN LISPRO 3 UNITS: 100 INJECTION, SOLUTION INTRAVENOUS; SUBCUTANEOUS at 22:42

## 2017-07-22 RX ADMIN — LOSARTAN POTASSIUM 25 MG: 25 TABLET ORAL at 09:29

## 2017-07-22 RX ADMIN — SODIUM CHLORIDE 75 ML/HR: 900 INJECTION, SOLUTION INTRAVENOUS at 17:39

## 2017-07-22 NOTE — ROUTINE PROCESS
Bedside and Verbal shift change report given to  Tamie Montgomery LPN(oncoming nurse) by June George, RN (offgoing nurse). Report included the following information SBAR and Kardex.

## 2017-07-22 NOTE — PROGRESS NOTES
Pt. bolused with 3.77 unit of Heparin. Heparin drip increased to 16units.kg/hour to 15.1ml, double checked with Sukhjinder Maxwell.  Next PTT at 10:52pm.

## 2017-07-22 NOTE — PROGRESS NOTES
Bedside and Verbal shift change report given to Francia BARRETT (oncoming nurse) by Bonner Collet, LPN (offgoing nurse). Report included the following information SBAR, Kardex, MAR and Recent Results.     SITUATION:    Code Status: Full Code   Reason for Admission: Kidney stone shifted in urethra   Renal stones   dx    [facility] day: 3   Problem List:       Hospital Problems  Date Reviewed: 7/19/2017          Codes Class Noted POA    Kidney stones ICD-10-CM: N20.0  ICD-9-CM: 592.0  7/19/2017 Unknown        Diabetes mellitus type 2, controlled ([facility] [address] ICD-10-CM: E11.9  ICD-9-CM: 250.00  7/19/2017 Unknown        Renal stones ICD-10-CM: N20.0  ICD-9-CM: 592.0  7/19/2017 Unknown              BACKGROUND:    Past Medical History:   Past Medical History:   Diagnosis Date    Diabetes ([facility] [address]     Gall stones     Hematuria     Hypertension     Kidney stone          Patient taking anticoagulants no     ASSESSMENT:    Changes in Assessment Throughout Shift: None     Patient has Central Line: no      Patient has Mcbride Cath: yes Reasons if yes: Surgery      Last Vitals:     Vitals:    07/21/17 1953 07/22/17 0334 07/22/17 0821 07/22/17 1134   BP: 119/79 109/68 111/67 106/64   Pulse: 87 94 98 86   Resp: 16 16 16 16   Temp: 96.7 °F (35.9 °C) 97.6 °F (36.4 °C) 98 °F (36.7 °C) 97.5 °F (36.4 °C)   SpO2: 98% 98% 97% 95%   Weight:            IV and DRAINS (will only show if present)   Peripheral IV Right Antecubital-Site Assessment: Clean, dry, & intact     WOUND (if present)   Wound Type:  none   Dressing present Dressing Present : No   Wound Concerns/Notes:  none     PAIN    Pain Assessment    Pain Intensity 1: 0 (07/22/17 0854)    Pain Location 1: Abdomen    Pain Intervention(s) 1: Medication (see MAR)    Patient Stated Pain Goal: 0  o Interventions for Pain:  Percocet/Dilaudid  o Intervention effective: no  o Time of last intervention: NA   o Reassessment Completed: yes      Last 3 Weights:  Last 3 Recorded Weights in this Encounter    07/20/17 1034 07/20/17 1036   Weight: 93.9 kg (207 lb) 94.3 kg (207 lb 14.4 oz)     Weight change:      INTAKE/OUPUT    Current Shift: 07/22 0701 - 07/22 1900  In: -   Out: 200 [Urine:200]    Last three shifts: 07/20 1901 - 07/22 0700  In: 1000 [I.V.:1000]  Out: 2100 [Urine:2100]     LAB RESULTS     Recent Labs      07/22/17   0045  07/21/17   0254  07/20/17   1429   WBC  7.3  10.5  9.4   HGB  12.8*  12.7*  13.4   HCT  36.6  37.2  39.0   PLT  180  204  185        Recent Labs      07/22/17   0045  07/21/17   0254  07/20/17   0329   NA  138  138  140   K  3.8  4.4  4.8   GLU  137*  190*  198*   BUN  19*  23*  21*   CREA  1.60*  1.83*  1.59*   CA  10.1  9.7  9.8       RECOMMENDATIONS AND DISCHARGE PLANNING     1. Pending tests/procedures/ Plan of Care or Other Needs: none     2. Discharge plan for patient and Needs/Barriers: Home    3. Estimated Discharge Date: 7/25/2017 Posted on [employer] in [facility]: yes      4. The patient's care plan was reviewed with the oncoming nurse. \"HEALS\" SAFETY CHECK      Fall Risk    Total Score: 2    Safety Measures: Safety Measures: Bed/Chair-Wheels locked, Bed in low position, Call light within reach, Fall prevention (comment), Gripper socks    A safety check occurred in the patient's room between off going nurse and oncoming nurse listed above.     The safety check included the below items  Area Items   H  High Alert Medications - Verify all high alert medication drips (heparin, PCA, etc.)   E  Equipment - Suction is set up for ALL patients (with yanker)  - Red plugs utilized for all equipment (IV pumps, etc.)  - WOWs wiped down at end of shift.  - Room stocked with oxygen, suction, and other unit-specific supplies   A  Alarms - Bed alarm is set for fall risk patients  - Ensure chair alarm is in place and activated if patient is up in a chair   L  Lines - Check IV for any infiltration  - Mcbride bag is empty if patient has a Mcbride   - Tubing and IV bags are labeled   S  Safety   - Room is clean, patient is clean, and equipment is clean. - Hallways are clear from equipment besides carts. - Fall bracelet on for fall risk patients  - Ensure room is clear and free of clutter  - Suction is set up for ALL patients (with giselaker)  - Hallways are clear from equipment besides carts.    - Isolation precautions followed, supplies available outside room, sign posted     Oral MARY gannon

## 2017-07-22 NOTE — PROGRESS NOTES
0891 Received in bed. A/OX4. Denies pain. Removed 02 2 liters via nasal cannula. 02 saturation 98% 2 liters. No resp. distress noted. Fall precautions. Call bell phone within reach. Heparin drip infusing without any problems. Left leg immobilizer in place.

## 2017-07-22 NOTE — ANESTHESIA POSTPROCEDURE EVALUATION
Post-Anesthesia Evaluation and Assessment    Patient: Ruth Alicia MRN: [id number]  SSN: [id number]    YOB: 1955  Age: 58 y.o. Sex: male       Cardiovascular Function/Vital Signs  Visit Vitals    /79 (BP 1 Location: Left arm, BP Patient Position: At rest;Head of bed elevated (Comment degrees))    Pulse 87    Temp 35.9 °C (96.7 °F)    Resp 16    Wt 94.3 kg (207 lb 14.4 oz)    SpO2 98%    BMI 28.2 kg/m2       Patient is status post general, MAC anesthesia for Procedure(s):  CYSTOSCOPY RETROGRADE URETERAL STENT INSERTION. Nausea/Vomiting: None    Postoperative hydration reviewed and adequate. Pain:  Pain Scale 1: Numeric (0 - 10) (07/21/17 2000)  Pain Intensity 1: 0 (07/21/17 2000)   Managed    Neurological Status:   Neuro (WDL): Within Defined Limits (07/21/17 1808)   At baseline    Mental Status and Level of Consciousness: Arousable    Pulmonary Status:   O2 Device: Nasal cannula (07/21/17 1813)   Adequate oxygenation and airway patent    Complications related to anesthesia: None    Post-anesthesia assessment completed.  No concerns      Signed By: Pérez Damon MD     July 21, 2017

## 2017-07-22 NOTE — PROGRESS NOTES
[facility]  [facility]    Patient: Tommie Mclean Age: 58 y.o. : 1955 MR#: [id number] SSN: [id number]  Date/Time: 2017 9:37 AM    Subjective/24-hour events:     Feeling significantly better overall. No pain reported currently. Afebrile overnight. Assessment:   Acute DVT  Nephrolithiasis with obstructive uropathy s/p R ureteral stent placement  UTI  Recent RLE surgery   CAD hx    Plan:  Monitor renal function, Mcbride per urology. Continue antibiotic therapy as ordered, follow urine cultures. Heparin as ordered for now - decision on [facility] [place] operated by [facility] therapy TBD. Mobilize as tolerated. Definitive stone management as outpatient. Case discussed with:  [x]Patient  [x]Family  []Nursing  []Case Management  DVT Prophylaxis:  []Lovenox  []Hep SQ  []SCDs  []Coumadin   [x]On Heparin gtt    Objective:   VS:   Visit Vitals    /67 (BP 1 Location: Left arm, BP Patient Position: At rest)    Pulse 98    Temp 98 °F (36.7 °C)    Resp 16    Wt 94.3 kg (207 lb 14.4 oz)    SpO2 97%    BMI 28.2 kg/m2      Tmax/24hrs: Temp (24hrs), Av.6 °F (36.4 °C), Min:96.7 °F (35.9 °C), Max:98.1 °F (36.7 °C)      Intake/Output Summary (Last 24 hours) at 17 0937  Last data filed at 17 0854   Gross per 24 hour   Intake             1000 ml   Output             1550 ml   Net             -550 ml       General:  In NAD. Nontoxic-appearing. Cardiovascular:  RRR. Pulmonary:  Clear, no wheezes. Effort nonlabored. GI:  Abdomen soft, NTTP. Extremities:  Warm, no ischemia. Additional:  Awake and alert. Moves extremities spontaneously.     Labs:    Recent Results (from the past 24 hour(s))   GLUCOSE, POC    Collection Time: 17 12:08 PM   Result Value Ref Range    Glucose (POC) 182 (H) 70 - 110 mg/dL   GLUCOSE, POC    Collection Time: 17  3:15 PM   Result Value Ref Range    Glucose (POC) 170 (H) 70 - 110 mg/dL   EKG, 12 LEAD, INITIAL    Collection Time: 17 3:43 PM   Result Value Ref Range    Ventricular Rate 78 BPM    Atrial Rate 78 BPM    P-R Interval 150 ms    QRS Duration 80 ms    Q-T Interval 358 ms    QTC Calculation (Bezet) 408 ms    Calculated P Axis 55 degrees    Calculated R Axis 49 degrees    Calculated T Axis 67 degrees    Diagnosis       Normal sinus rhythm  Normal ECG  No previous ECGs available  Confirmed by Jono Rubio MD, [place] Boubacar ([id number]) on 7/22/2017 9:33:39 AM     GLUCOSE, POC    Collection Time: 07/21/17  4:54 PM   Result Value Ref Range    Glucose (POC) 160 (H) 70 - 110 mg/dL   GLUCOSE, POC    Collection Time: 07/21/17  6:19 PM   Result Value Ref Range    Glucose (POC) 138 (H) 70 - 110 mg/dL   PTT    Collection Time: 07/21/17  9:25 PM   Result Value Ref Range    aPTT 75.5 (H) 23.0 - 36.4 SEC   GLUCOSE, POC    Collection Time: 07/21/17  9:40 PM   Result Value Ref Range    Glucose (POC) 138 (H) 70 - 110 mg/dL   PTT    Collection Time: 07/22/17 12:45 AM   Result Value Ref Range    aPTT 92.1 (H) 23.0 - 48.5 SEC   METABOLIC PANEL, BASIC    Collection Time: 07/22/17 12:45 AM   Result Value Ref Range    Sodium 138 136 - 145 mmol/L    Potassium 3.8 3.5 - 5.5 mmol/L    Chloride 106 100 - 108 mmol/L    CO2 24 21 - 32 mmol/L    Anion gap 8 3.0 - 18 mmol/L    Glucose 137 (H) 74 - 99 mg/dL    BUN 19 (H) 7.0 - 18 MG/DL    Creatinine 1.60 (H) 0.6 - 1.3 MG/DL    BUN/Creatinine ratio 12 12 - 20      GFR est AA 53 (L) >60 ml/min/1.73m2    GFR est non-AA 44 (L) >60 ml/min/1.73m2    Calcium 10.1 8.5 - 10.1 MG/DL   CBC WITH AUTOMATED DIFF    Collection Time: 07/22/17 12:45 AM   Result Value Ref Range    WBC 7.3 4.6 - 13.2 K/uL    RBC 4.35 (L) 4.70 - 5.50 M/uL    HGB 12.8 (L) 13.0 - 16.0 g/dL    HCT 36.6 36.0 - 48.0 %    MCV 84.1 74.0 - 97.0 FL    MCH 29.4 24.0 - 34.0 PG    MCHC 35.0 31.0 - 37.0 g/dL    RDW 14.3 11.6 - 14.5 %    PLATELET 042 472 - 970 K/uL    MPV 9.5 9.2 - 11.8 FL    NEUTROPHILS 72 40 - 73 %    LYMPHOCYTES 13 (L) 21 - 52 %    MONOCYTES 13 (H) 3 - 10 % EOSINOPHILS 1 0 - 5 %    BASOPHILS 1 0 - 2 %    ABS. NEUTROPHILS 5.3 1.8 - 8.0 K/UL    ABS. LYMPHOCYTES 1.0 0.9 - 3.6 K/UL    ABS. MONOCYTES 0.9 0.05 - 1.2 K/UL    ABS. EOSINOPHILS 0.1 0.0 - 0.4 K/UL    ABS.  BASOPHILS 0.0 0.0 - 0.06 K/UL    DF AUTOMATED     PTT    Collection Time: 07/22/17  7:40 AM   Result Value Ref Range    aPTT 111.1 (H) 23.0 - 36.4 SEC   GLUCOSE, POC    Collection Time: 07/22/17  8:24 AM   Result Value Ref Range    Glucose (POC) 147 (H) 70 - 110 mg/dL       Signed By: Mayra Bryson MD     July 22, 2017 9:37 AM

## 2017-07-22 NOTE — PROGRESS NOTES
Chart checked Cr. It's getting better. Down to 1.6 from 1.8. Spoke to nurse. Pt doing well.   Gave her instructions to d/c monzon and check PVR x2 and call me if over 250cc residual.    Joanne Ormond, MD  [facility]

## 2017-07-23 LAB
ANION GAP BLD CALC-SCNC: 7 MMOL/L (ref 3–18)
APTT PPP: 32.6 SEC (ref 23–36.4)
APTT PPP: 95.9 SEC (ref 23–36.4)
BACTERIA SPEC CULT: NORMAL
BUN SERPL-MCNC: 20 MG/DL (ref 7–18)
BUN/CREAT SERPL: 15 (ref 12–20)
CALCIUM SERPL-MCNC: 10.2 MG/DL (ref 8.5–10.1)
CHLORIDE SERPL-SCNC: 107 MMOL/L (ref 100–108)
CO2 SERPL-SCNC: 25 MMOL/L (ref 21–32)
CREAT SERPL-MCNC: 1.3 MG/DL (ref 0.6–1.3)
GLUCOSE BLD STRIP.AUTO-MCNC: 158 MG/DL (ref 70–110)
GLUCOSE BLD STRIP.AUTO-MCNC: 161 MG/DL (ref 70–110)
GLUCOSE BLD STRIP.AUTO-MCNC: 181 MG/DL (ref 70–110)
GLUCOSE BLD STRIP.AUTO-MCNC: 207 MG/DL (ref 70–110)
GLUCOSE SERPL-MCNC: 160 MG/DL (ref 74–99)
POTASSIUM SERPL-SCNC: 3.8 MMOL/L (ref 3.5–5.5)
SERVICE CMNT-IMP: NORMAL
SODIUM SERPL-SCNC: 139 MMOL/L (ref 136–145)

## 2017-07-23 PROCEDURE — 74011250637 HC RX REV CODE- 250/637: Performed by: FAMILY MEDICINE

## 2017-07-23 PROCEDURE — 36415 COLL VENOUS BLD VENIPUNCTURE: CPT | Performed by: FAMILY MEDICINE

## 2017-07-23 PROCEDURE — 65270000029 HC RM PRIVATE

## 2017-07-23 PROCEDURE — 74011250637 HC RX REV CODE- 250/637: Performed by: INTERNAL MEDICINE

## 2017-07-23 PROCEDURE — 74011636637 HC RX REV CODE- 636/637: Performed by: FAMILY MEDICINE

## 2017-07-23 PROCEDURE — 85730 THROMBOPLASTIN TIME PARTIAL: CPT | Performed by: FAMILY MEDICINE

## 2017-07-23 PROCEDURE — 85730 THROMBOPLASTIN TIME PARTIAL: CPT | Performed by: INTERNAL MEDICINE

## 2017-07-23 PROCEDURE — 82962 GLUCOSE BLOOD TEST: CPT

## 2017-07-23 PROCEDURE — 80048 BASIC METABOLIC PNL TOTAL CA: CPT | Performed by: FAMILY MEDICINE

## 2017-07-23 RX ADMIN — TAMSULOSIN HYDROCHLORIDE 0.4 MG: 0.4 CAPSULE ORAL at 17:06

## 2017-07-23 RX ADMIN — INSULIN LISPRO 3 UNITS: 100 INJECTION, SOLUTION INTRAVENOUS; SUBCUTANEOUS at 16:40

## 2017-07-23 RX ADMIN — RIVAROXABAN 15 MG: 15 TABLET, FILM COATED ORAL at 10:11

## 2017-07-23 RX ADMIN — INSULIN LISPRO 3 UNITS: 100 INJECTION, SOLUTION INTRAVENOUS; SUBCUTANEOUS at 09:14

## 2017-07-23 RX ADMIN — RIVAROXABAN 15 MG: 15 TABLET, FILM COATED ORAL at 16:41

## 2017-07-23 RX ADMIN — INSULIN LISPRO 3 UNITS: 100 INJECTION, SOLUTION INTRAVENOUS; SUBCUTANEOUS at 21:20

## 2017-07-23 RX ADMIN — LOSARTAN POTASSIUM 25 MG: 25 TABLET ORAL at 09:12

## 2017-07-23 RX ADMIN — INSULIN LISPRO 6 UNITS: 100 INJECTION, SOLUTION INTRAVENOUS; SUBCUTANEOUS at 13:22

## 2017-07-23 NOTE — PROGRESS NOTES
Bedside and Verbal shift change report given to [facility] [employer] (oncoming nurse) by Iliana Owen RN   (offgoing nurse). Report given with SBAR, Kardex, MAR and Recent Results.

## 2017-07-23 NOTE — ROUTINE PROCESS
Bedside and Verbal shift change report given to All De La Torre RN (oncoming nurse) by [employer] RN (offgoing nurse). Report included the following information SBAR and Kardex.

## 2017-07-23 NOTE — PROGRESS NOTES
Paged Dr Lorne Marin answering service patient and spouse has multiple concerns awaiting call back    [address] [place] [address],[address] Dr Lorne Marin returned page and telephone call was transferred to pt's room  To voice concern on additional testing

## 2017-07-23 NOTE — PROGRESS NOTES
[facility]  Progress Note    Patient: Tj Groves Age: 58 y.o. : 1955 MR#: [id number] SSN: [id number]  Date/Time: 2017 8:44 AM    Subjective/24-hour events:     Continues to feel better clinically. Mcbride removed yesterday - voiding spontaneously since. Denies pain, no F/C/N/V. Assessment:   Acute DVT  Nephrolithiasis with obstructive uropathy s/p R ureteral stent placement  UTI  Recent RLE surgery   CAD hx    Plan:  Renal indices have continued to improve. Follow. Urine cultures remain negative. Continue to monitor off antibiotic therapy. D/C heparin and transition to NOAC - will initiate Xarelto. D/C IVF at noon, encourage continued PO intake. F/U labs in AM.  Mobilize as tolerated. Definitive stone management as outpatient. Likely home soon if continues to improve. .    Case discussed with:  [x]Patient  [x]Family  []Nursing  []Case Management  DVT Prophylaxis:  []Lovenox  []Hep SQ  []SCDs  []Coumadin   [x]On Heparin gtt    Objective:   VS:   Visit Vitals    /68 (BP 1 Location: Left arm, BP Patient Position: At rest)    Pulse 68    Temp 97.8 °F (36.6 °C)    Resp 18    Wt 94.3 kg (207 lb 14.4 oz)    SpO2 96%    BMI 28.2 kg/m2      Tmax/24hrs: Temp (24hrs), Av.4 °F (36.3 °C), Min:97 °F (36.1 °C), Max:97.8 °F (36.6 °C)      Intake/Output Summary (Last 24 hours) at 17 0844  Last data filed at 17 0518   Gross per 24 hour   Intake                0 ml   Output             2000 ml   Net            -2000 ml       General:  In NAD. Nontoxic-appearing. Cardiovascular:  RRR. Pulmonary:  Clear, no wheezes. Effort nonlabored. GI:  Abdomen soft, NTTP. Extremities:  Warm, no ischemia. Additional:  Awake and alert. Moves extremities spontaneously.     Labs:    Recent Results (from the past 24 hour(s))   GLUCOSE, POC    Collection Time: 17 11:33 AM   Result Value Ref Range    Glucose (POC) 221 (H) 70 - 110 mg/dL   PTT Collection Time: 07/22/17  3:35 PM   Result Value Ref Range    aPTT 58.3 (H) 23.0 - 36.4 SEC   GLUCOSE, POC    Collection Time: 07/22/17  3:43 PM   Result Value Ref Range    Glucose (POC) 151 (H) 70 - 110 mg/dL   GLUCOSE, POC    Collection Time: 07/22/17 10:40 PM   Result Value Ref Range    Glucose (POC) 196 (H) 70 - 110 mg/dL   PTT    Collection Time: 07/22/17 11:15 PM   Result Value Ref Range    aPTT 103.9 (H) 23.0 - 29.6 SEC   METABOLIC PANEL, BASIC    Collection Time: 07/23/17  6:45 AM   Result Value Ref Range    Sodium 139 136 - 145 mmol/L    Potassium 3.8 3.5 - 5.5 mmol/L    Chloride 107 100 - 108 mmol/L    CO2 25 21 - 32 mmol/L    Anion gap 7 3.0 - 18 mmol/L    Glucose 160 (H) 74 - 99 mg/dL    BUN 20 (H) 7.0 - 18 MG/DL    Creatinine 1.30 0.6 - 1.3 MG/DL    BUN/Creatinine ratio 15 12 - 20      GFR est AA >60 >60 ml/min/1.73m2    GFR est non-AA 56 (L) >60 ml/min/1.73m2    Calcium 10.2 (H) 8.5 - 10.1 MG/DL   PTT    Collection Time: 07/23/17  6:45 AM   Result Value Ref Range    aPTT 95.9 (H) 23.0 - 36.4 SEC   GLUCOSE, POC    Collection Time: 07/23/17  8:11 AM   Result Value Ref Range    Glucose (POC) 161 (H) 70 - 110 mg/dL       Signed By: Tammi Berry MD     July 23, 2017 8:44 AM

## 2017-07-24 VITALS
WEIGHT: 207.9 LBS | SYSTOLIC BLOOD PRESSURE: 113 MMHG | DIASTOLIC BLOOD PRESSURE: 74 MMHG | RESPIRATION RATE: 16 BRPM | TEMPERATURE: 97.1 F | OXYGEN SATURATION: 97 % | HEART RATE: 79 BPM | BODY MASS INDEX: 28.2 KG/M2

## 2017-07-24 LAB
ANION GAP BLD CALC-SCNC: 6 MMOL/L (ref 3–18)
BUN SERPL-MCNC: 18 MG/DL (ref 7–18)
BUN/CREAT SERPL: 15 (ref 12–20)
CALCIUM SERPL-MCNC: 10.2 MG/DL (ref 8.5–10.1)
CHLORIDE SERPL-SCNC: 108 MMOL/L (ref 100–108)
CO2 SERPL-SCNC: 26 MMOL/L (ref 21–32)
CREAT SERPL-MCNC: 1.23 MG/DL (ref 0.6–1.3)
GLUCOSE BLD STRIP.AUTO-MCNC: 154 MG/DL (ref 70–110)
GLUCOSE BLD STRIP.AUTO-MCNC: 162 MG/DL (ref 70–110)
GLUCOSE SERPL-MCNC: 147 MG/DL (ref 74–99)
POTASSIUM SERPL-SCNC: 4.2 MMOL/L (ref 3.5–5.5)
SODIUM SERPL-SCNC: 140 MMOL/L (ref 136–145)

## 2017-07-24 PROCEDURE — 74011636637 HC RX REV CODE- 636/637: Performed by: FAMILY MEDICINE

## 2017-07-24 PROCEDURE — 82962 GLUCOSE BLOOD TEST: CPT

## 2017-07-24 PROCEDURE — 74011250637 HC RX REV CODE- 250/637: Performed by: FAMILY MEDICINE

## 2017-07-24 PROCEDURE — 74011250637 HC RX REV CODE- 250/637: Performed by: INTERNAL MEDICINE

## 2017-07-24 PROCEDURE — 80048 BASIC METABOLIC PNL TOTAL CA: CPT | Performed by: FAMILY MEDICINE

## 2017-07-24 PROCEDURE — 36415 COLL VENOUS BLD VENIPUNCTURE: CPT | Performed by: FAMILY MEDICINE

## 2017-07-24 RX ORDER — OXYCODONE AND ACETAMINOPHEN 5; 325 MG/1; MG/1
1 TABLET ORAL
Qty: 20 TAB | Refills: 0 | Status: ON HOLD | OUTPATIENT
Start: 2017-07-24 | End: 2017-08-29

## 2017-07-24 RX ADMIN — INSULIN LISPRO 3 UNITS: 100 INJECTION, SOLUTION INTRAVENOUS; SUBCUTANEOUS at 11:30

## 2017-07-24 RX ADMIN — LOSARTAN POTASSIUM 25 MG: 25 TABLET ORAL at 08:24

## 2017-07-24 RX ADMIN — RIVAROXABAN 15 MG: 15 TABLET, FILM COATED ORAL at 08:24

## 2017-07-24 RX ADMIN — INSULIN LISPRO 3 UNITS: 100 INJECTION, SOLUTION INTRAVENOUS; SUBCUTANEOUS at 08:25

## 2017-07-24 NOTE — DISCHARGE SUMMARY
Discharge Summary    Patient: Julio Zuluaga MRN: [id number]  CSN: [id number]    YOB: 1955  Age: 58 y.o. Sex: male    DOA: 7/19/2017 LOS:  LOS: 5 days   Discharge Date: 7/24/2017     Admission Diagnoses:   Nephrolithiasis  Obstructive uropathy    Discharge Diagnoses:    Acute DVT  Nephrolithiasis with obstructive uropathy s/p R ureteral stent placement  UTI  Recent RLE surgery   CAD hx    Discharge Condition: Stable    PHYSICAL EXAM  Visit Vitals    /74 (BP 1 Location: Left arm, BP Patient Position: Head of bed elevated (Comment degrees))    Pulse 79    Temp 97.1 °F (36.2 °C)    Resp 16    Wt 94.3 kg (207 lb 14.4 oz)    SpO2 97%    BMI 28.2 kg/m2       General: In NAD. HEENT: NCAT. Sclerae anicteric. EOMI. Lungs:  Clear, no wheezes. Effort nonlabored. Heart:  RRR. Abdomen: Soft, NTTp. Extremities: Warm, no ischemia. Psych:   Mood normal.  Neurologic:  Awake and alert, motor grossly nonfocal.    Hospital Course:   See admission H&P for full details of HPI. Patient admitted to medical bed as transfer from [facility] in [place] [address] [address] [facility] PVL study was done due to RLE swelling and he was found to have an acute DVT. IV heparin was initiated per protocol and patient has remained stable in this regard. Transition has been made to [address] [place] [address] without difficulty and patient will need to continue medication at discharge. Urology evaluation was obtained and decision was made to take patient to OR for stent placement for management of obstructive uropathy secondary to nephrolithiasis. Procedure was performed without complication and patient has continued to improve post procedure. Pain is essentially resolved at this point and serum creatinine has continued to decline. He is medically stable for discharge home today with outpatient follow up as advised.       Consults:   [facility]    Significant Diagnostic Studies:  LE Venous PVL:  INTERPRETATION/FINDINGS  Duplex images were obtained using 2-D gray scale, color flow, and  spectral Doppler analysis. Right le. No evidence of deep venous thrombosis detected in the veins  visualized. 2. Deep veins visualized include the common femoral, femoral,  popliteal, posterior tibial and peroneal veins. 3. No evidence of superficial thrombosis detected. 4. Superficial veins visualized include the proximal great saphenous  vein. Left leg :  1. Acute occlusive deep venous thrombosis identified in the popliteal  vein. 2. Acute non-occlusive deep venous thrombosis identified in the common   femoral, and femoral veins. 3. Deep veins visualized include the common femoral, femoral,  popliteal, posterior tibial and peroneal veins. 4. No evidence of superficial thrombosis detected. 5. Superficial veins visualized include the proximal great saphenous  vein. Discharge Medications:     Current Discharge Medication List      START taking these medications    Details   oxyCODONE-acetaminophen (PERCOCET) 5-325 mg per tablet Take 1 Tab by mouth every four (4) hours as needed. Max Daily Amount: 6 Tabs. Qty: 20 Tab, Refills: 0      !! rivaroxaban (XARELTO) 15 mg tab tablet Take 1 Tab by mouth two (2) times daily (with meals). Qty: 32 Tab, Refills: 0      !! rivaroxaban (XARELTO) 20 mg tab tablet Take 1 Tab by mouth daily (with breakfast). Begin this prescription after 15 mg BID supply of medication is exhausted. Qty: 30 Tab, Refills: 2       !! - Potential duplicate medications found. Please discuss with provider. CONTINUE these medications which have NOT CHANGED    Details   fenofibrate (LOFIBRA) 160 mg tablet       losartan (COZAAR) 25 mg tablet       JANUMET XR 50-1,000 mg TM24 Refills: 5      tamsulosin (FLOMAX) 0.4 mg capsule Take 1 Cap by mouth daily (after dinner).   Qty: 40 Cap, Refills: 0               Activity: As tolerated    Diet: Cardiac Diet    Follow-up: with PCPZheng Colletta Blew, MD in 1 week and [facility] in 1-2 weeks or as directed. Lupe Vergara.  Migdalia Sofia MD  [facility]

## 2017-07-24 NOTE — ROUTINE PROCESS
Discharge instructions explained and understood by the patient. IV discontinued, no redness, swelling or pain noted. Pt discharged off the unit via wheelchair with [facility] transport accompanied by wife.

## 2017-07-24 NOTE — DISCHARGE INSTRUCTIONS
Urinary Tract Infections in Men: Care Instructions  Your Care Instructions    A urinary tract infection, or UTI, is a general term for an infection anywhere between the kidneys and the tip of the penis. UTIs can also be a result of a prostate problem. Most cause pain or burning when you urinate. Most UTIs are caused by bacteria and can be cured with antibiotics. It is important to complete your treatment so that the infection does not get worse. Follow-up care is a key part of your treatment and safety. Be sure to make and go to all appointments, and call your doctor if you are having problems. It's also a good idea to know your test results and keep a list of the medicines you take. How can you care for yourself at home? · Take your antibiotics as prescribed. Do not stop taking them just because you feel better. You need to take the full course of antibiotics. · Take your medicines exactly as prescribed. Your doctor may have prescribed a medicine, such as phenazopyridine (Pyridium), to help relieve pain when you urinate. This turns your urine orange. You may stop taking it when your symptoms get better. But be sure to take all of your antibiotics, which treat the infection. · Drink extra water for the next day or two. This will help make the urine less concentrated and help wash out the bacteria causing the infection. (If you have kidney, heart, or liver disease and have to limit your fluids, talk with your doctor before you increase your fluid intake.)  · Avoid drinks that are carbonated or have caffeine. They can irritate the bladder. · Urinate often. Try to empty your bladder each time. · To relieve pain, take a hot bath or lay a heating pad (set on low) over your lower belly or genital area. Never go to sleep with a heating pad in place. To help prevent UTIs  · Drink plenty of fluids, enough so that your urine is light yellow or clear like water.  If you have kidney, heart, or liver disease and have to limit fluids, talk with your doctor before you increase the amount of fluids you drink. · Urinate when you have the urge. Do not hold your urine for a long time. Urinate before you go to sleep. · Keep your penis clean. Catheter care  If you have a drainage tube (catheter) in place, the following steps will help you care for it. · Always wash your hands before and after touching your catheter. · Check the area around the urethra for inflammation or signs of infection. Signs of infection include irritated, swollen, red, or tender skin, or pus around the catheter. · Clean the area around the catheter with soap and water two times a day. Dry with a clean towel afterward. · Do not apply powder or lotion to the skin around the catheter. To empty the urine collection bag  · Wash your hands with soap and water. · Without touching the drain spout, remove the spout from its sleeve at the bottom of the collection bag. Open the valve on the spout. · Let the urine flow out of the bag and into the toilet or a container. Do not let the tubing or drain spout touch anything. · After you empty the bag, clean the end of the drain spout with tissue and water. Close the valve and put the drain spout back into its sleeve at the bottom of the collection bag. · Wash your hands with soap and water. When should you call for help? Call your doctor now or seek immediate medical care if:  · Symptoms such as a fever, chills, nausea, or vomiting get worse or happen for the first time. · You have new pain in your back just below your rib cage. This is called flank pain. · There is new blood or pus in your urine. · You are not able to take or keep down your antibiotics. Watch closely for changes in your health, and be sure to contact your doctor if:  · You are not getting better after taking an antibiotic for 2 days. · Your symptoms go away but then come back. Where can you learn more?   Go to http://tamanna-darwin.info/. Enter O866 in the search box to learn more about \"Urinary Tract Infections in Men: Care Instructions. \"  Current as of: November 28, 2016  Content Version: 11.3  © 7017-5334 [employer]. Care instructions adapted under license by [employer] (which disclaims liability or warranty for this information). If you have questions about a medical condition or this instruction, always ask your healthcare professional. [address] any warranty or liability for your use of this information. Deep Vein Thrombosis: Care Instructions  Your Care Instructions    A deep vein thrombosis (DVT) is a blood clot in certain veins of the legs, pelvis, or arms. Blood clots in these veins need to be treated because they can get bigger, break loose, and travel through the bloodstream to the lungs. A blood clot in a lung can be life-threatening. The doctor may have given you a blood thinner (anticoagulant). A blood thinner can stop the blood clot from growing larger and prevent new clots from forming. You will need to take a blood thinner for 3 to 6 months or longer. The doctor has checked you carefully, but problems can develop later. If you notice any problems or new symptoms, get medical treatment right away. Follow-up care is a key part of your treatment and safety. Be sure to make and go to all appointments, and call your doctor if you are having problems. It's also a good idea to know your test results and keep a list of the medicines you take. How can you care for yourself at home? · Take your medicines exactly as prescribed. Call your doctor if you think you are having a problem with your medicine. · If you are taking a blood thinner, be sure you get instructions about how to take your medicine safely. Blood thinners can cause serious bleeding problems. · Wear compression stockings if your doctor recommends them.  These stockings are tighter at the feet than on the legs. They may reduce pain and swelling in your legs. But there are different types of stockings, and they need to fit right. So your doctor will recommend what you need. · When you sit, use a pillow to raise the arm or leg that has the blood clot. Try to keep it above the level of your heart. When should you call for help? Call 911 anytime you think you may need emergency care. For example, call if:  · You passed out (lost consciousness). · You have symptoms of a blood clot in your lung (called a pulmonary embolism). These include:  ¨ Sudden chest pain. ¨ Trouble breathing. ¨ Coughing up blood. Call your doctor now or seek immediate medical care if:  · You have new or worse trouble breathing. · You are dizzy or lightheaded, or you feel like you may faint. · You have symptoms of a blood clot in your arm or leg. These may include:  ¨ Pain in the arm, calf, back of the knee, thigh, or groin. ¨ Redness and swelling in the arm, leg, or groin. Watch closely for changes in your health, and be sure to contact your doctor if:  · You do not get better as expected. Where can you learn more? Go to http://tamanna-darwin.info/. Enter O138 in the search box to learn more about \"Deep Vein Thrombosis: Care Instructions. \"  Current as of: March 20, 2017  Content Version: 11.3  © 7823-3356 [employer]. Care instructions adapted under license by [employer] (which disclaims liability or warranty for this information). If you have questions about a medical condition or this instruction, always ask your healthcare professional. [place] [address] any warranty or liability for your use of this information. Patient armband removed and given to patient to take home. Patient was informed of the privacy risks if armband lost or stolen    "[employer]" Activation    Thank you for requesting access to "[employer]".  Please follow the instructions below to securely access and download your online medical record. [employer] allows you to send messages to your doctor, view your test results, renew your prescriptions, schedule appointments, and more. How Do I Sign Up? 1. In your internet browser, go to [email or web address]  2. Click on the First Time User? Click Here link in the Sign In box. You will be redirect to the New Member Sign Up page. 3. Enter your S[employer]im[employer] Access Code exactly as it appears below. You will not need to use this code after youve completed the sign-up process. If you do not sign up before the expiration date, you must request a new code. [employer] Access Code: [email or web address]  Expires: 10/16/2017 12:44 PM (This is the date your [employer] access code will )    4. Enter the last four digits of your Social Security Number (xxxx) and Date of Birth (mm/dd/yyyy) as indicated and click Submit. You will be taken to the next sign-up page. 5. Create a S[employer]imed ID. This will be your [employer] login ID and cannot be changed, so think of one that is secure and easy to remember. 6. Create a [employer] password. You can change your password at any time. 7. Enter your Password Reset Question and Answer. This can be used at a later time if you forget your password. 8. Enter your e-mail address. You will receive e-mail notification when new information is available in [address] 9. Click Sign Up. You can now view and download portions of your medical record. 10. Click the Download Summary menu link to download a portable copy of your medical information. Additional Information    If you have questions, please visit the Frequently Asked Questions section of the [employer] website at [email or web address] Remember, [employer] is NOT to be used for urgent needs. For medical emergencies, dial 911.       DISCHARGE SUMMARY from Nurse    The following personal items are in your possession at time of discharge:    Dental Appliances: None  Visual Aid: Glasses, With patient     Home Medications: None  Jewelry: None  Clothing: Shirt, Shorts, Footwear, Undergarments, With patient  Other Valuables: Cell Phone  Personal Items Sent to Safe: n/a          PATIENT INSTRUCTIONS:    After general anesthesia or intravenous sedation, for 24 hours or while taking prescription Narcotics:  · Limit your activities  · Do not drive and operate hazardous machinery  · Do not make important personal or business decisions  · Do  not drink alcoholic beverages  · If you have not urinated within 8 hours after discharge, please contact your surgeon on call. Report the following to your surgeon:  · Excessive pain, swelling, redness or odor of or around the surgical area  · Temperature over 100.5  · Nausea and vomiting lasting longer than 4 hours or if unable to take medications  · Any signs of decreased circulation or nerve impairment to extremity: change in color, persistent  numbness, tingling, coldness or increase pain  · Any questions        What to do at Home:  Recommended activity: Activity as tolerated    If you experience any of the following symptoms Nausea, vomiting, diarrhea, fever greater than 100.5, dizziness, severe headache, shortness of breath, chest pain, increased pain, please follow up with PCP. *  Please give a list of your current medications to your Primary Care Provider. *  Please update this list whenever your medications are discontinued, doses are      changed, or new medications (including over-the-counter products) are added. *  Please carry medication information at all times in case of emergency situations. These are general instructions for a healthy lifestyle:    No smoking/ No tobacco products/ Avoid exposure to second hand smoke    Surgeon General's Warning:  Quitting smoking now greatly reduces serious risk to your health.     Obesity, smoking, and sedentary lifestyle greatly increases your risk for illness    A healthy diet, regular physical exercise & weight monitoring are important for maintaining a healthy lifestyle    You may be retaining fluid if you have a history of heart failure or if you experience any of the following symptoms:  Weight gain of 3 pounds or more overnight or 5 pounds in a week, increased swelling in our hands or feet or shortness of breath while lying flat in bed. Please call your doctor as soon as you notice any of these symptoms; do not wait until your next office visit. Recognize signs and symptoms of STROKE:    F-face looks uneven    A-arms unable to move or move unevenly    S-speech slurred or non-existent    T-time-call 911 as soon as signs and symptoms begin-DO NOT go       Back to bed or wait to see if you get better-TIME IS BRAIN. Warning Signs of HEART ATTACK     Call 911 if you have these symptoms:   Chest discomfort. Most heart attacks involve discomfort in the center of the chest that lasts more than a few minutes, or that goes away and comes back. It can feel like uncomfortable pressure, squeezing, fullness, or pain.  Discomfort in other areas of the upper body. Symptoms can include pain or discomfort in one or both arms, the back, neck, jaw, or stomach.  Shortness of breath with or without chest discomfort.  Other signs may include breaking out in a cold sweat, nausea, or lightheadedness. Don't wait more than five minutes to call 911 - MINUTES MATTER! Fast action can save your life. Calling 911 is almost always the fastest way to get lifesaving treatment. Emergency Medical Services staff can begin treatment when they arrive -- up to an hour sooner than if someone gets to the hospital by car. The discharge information has been reviewed with the patient. The patient verbalized understanding. Discharge medications reviewed with the patient and appropriate educational materials and side effects teaching were provided.

## 2017-07-24 NOTE — ROUTINE PROCESS
Bedside shift change report given to Saroj Osman RN (oncoming nurse) by Rima Post RN (offgoing nurse). Report included the following information SBAR, Intake/Output and MAR.

## 2017-08-29 ENCOUNTER — HOSPITAL ENCOUNTER (INPATIENT)
Age: 62
LOS: 3 days | Discharge: HOME HEALTH | DRG: 660 | End: 2017-09-01
Attending: INTERNAL MEDICINE | Admitting: INTERNAL MEDICINE
Payer: COMMERCIAL

## 2017-08-29 PROBLEM — R31.0 GROSS HEMATURIA: Status: ACTIVE | Noted: 2017-08-29

## 2017-08-29 PROBLEM — I25.10 CAD (CORONARY ARTERY DISEASE): Status: ACTIVE | Noted: 2017-08-29

## 2017-08-29 PROBLEM — D62 ACUTE BLOOD LOSS ANEMIA: Status: ACTIVE | Noted: 2017-08-29

## 2017-08-29 LAB — GLUCOSE BLD STRIP.AUTO-MCNC: 143 MG/DL (ref 70–110)

## 2017-08-29 PROCEDURE — 65270000029 HC RM PRIVATE

## 2017-08-29 PROCEDURE — 82962 GLUCOSE BLOOD TEST: CPT

## 2017-08-29 RX ORDER — GLUCOSAM/CHONDRO/HERB 149/HYAL 750-100 MG
1000 TABLET ORAL 2 TIMES DAILY
COMMUNITY
Start: 2017-08-29

## 2017-08-29 RX ORDER — DEXTROSE 50 % IN WATER (D50W) INTRAVENOUS SYRINGE
25-50 AS NEEDED
Status: DISCONTINUED | OUTPATIENT
Start: 2017-08-29 | End: 2017-08-31 | Stop reason: SDUPTHER

## 2017-08-29 RX ORDER — INSULIN LISPRO 100 [IU]/ML
INJECTION, SOLUTION INTRAVENOUS; SUBCUTANEOUS
Status: DISCONTINUED | OUTPATIENT
Start: 2017-08-30 | End: 2017-09-01 | Stop reason: HOSPADM

## 2017-08-29 RX ORDER — ONDANSETRON 2 MG/ML
4 INJECTION INTRAMUSCULAR; INTRAVENOUS
Status: DISCONTINUED | OUTPATIENT
Start: 2017-08-29 | End: 2017-09-01 | Stop reason: HOSPADM

## 2017-08-29 RX ORDER — FAMOTIDINE 10 MG/ML
20 INJECTION INTRAVENOUS EVERY 12 HOURS
Status: DISCONTINUED | OUTPATIENT
Start: 2017-08-30 | End: 2017-09-01 | Stop reason: HOSPADM

## 2017-08-29 RX ORDER — MAGNESIUM SULFATE 100 %
16 CRYSTALS MISCELLANEOUS AS NEEDED
Status: DISCONTINUED | OUTPATIENT
Start: 2017-08-29 | End: 2017-08-31 | Stop reason: SDUPTHER

## 2017-08-29 RX ORDER — INSULIN LISPRO 100 [IU]/ML
INJECTION, SOLUTION INTRAVENOUS; SUBCUTANEOUS EVERY 6 HOURS
Status: DISCONTINUED | OUTPATIENT
Start: 2017-08-30 | End: 2017-08-29

## 2017-08-29 RX ORDER — MORPHINE SULFATE 2 MG/ML
2 INJECTION, SOLUTION INTRAMUSCULAR; INTRAVENOUS
Status: DISCONTINUED | OUTPATIENT
Start: 2017-08-29 | End: 2017-09-01 | Stop reason: HOSPADM

## 2017-08-29 RX ORDER — ERGOCALCIFEROL 1.25 MG/1
50000 CAPSULE ORAL
COMMUNITY
Start: 2017-08-28

## 2017-08-29 NOTE — IP AVS SNAPSHOT
[facility] 
 
 
 [address] [facility] [address] Patient: Francheska Hurst MRN: [id number] FEA:2/84/2021 Current Discharge Medication List  
  
START taking these medications Dose & Instructions Dispensing Information Comments Morning Noon Evening Bedtime  
 oxyCODONE-acetaminophen 5-325 mg per tablet Commonly known as:  PERCOCET Your last dose was: Your next dose is:    
   
   
 Dose:  1 Tab Take 1 Tab by mouth every four (4) hours as needed. Max Daily Amount: 6 Tabs. Quantity:  20 Tab Refills:  0 CONTINUE these medications which have NOT CHANGED Dose & Instructions Dispensing Information Comments Morning Noon Evening Bedtime  
 fenofibrate 160 mg tablet Commonly known as:  LOFIBRA Your last dose was: Your next dose is:    
   
   
  Refills:  0 JANUMET XR 50-1,000 mg Tm24 Generic drug:  SITagliptin-metFORMIN Your last dose was: Your next dose is:    
   
   
  Refills:  5  
     
   
   
   
  
 losartan 25 mg tablet Commonly known as:  COZAAR Your last dose was: Your next dose is:    
   
   
  Refills:  0 Omega-3-DHA-EPA-Fish Oil 1,000 mg (120 mg-180 mg) Cap Your last dose was: Your next dose is:    
   
   
 Dose:  1000 mg Take 1,000 mg by mouth two (2) times a day. Indications: cholesterol Refills:  0  
     
   
   
   
  
 tamsulosin 0.4 mg capsule Commonly known as:  FLOMAX Your last dose was: Your next dose is:    
   
   
 Dose:  0.4 mg Take 1 Cap by mouth daily (after dinner). Quantity:  40 Cap Refills:  0  
     
   
   
   
  
 VITAMIN D2 50,000 unit capsule Generic drug:  ergocalciferol Your last dose was: Your next dose is:    
   
   
 Dose:  27615 Units Take 50,000 Units by mouth Every Mon, Wed & Sun. Indications: VITAMIN D DEFICIENCY (HIGH DOSE THERAPY) Refills:  0 STOP taking these medications   
 rivaroxaban 20 mg Tab tablet Commonly known as:  Josefina Dhaliwal Where to Get Your Medications Information on where to get these meds will be given to you by the nurse or doctor. ! Ask your nurse or doctor about these medications  
  oxyCODONE-acetaminophen 5-325 mg per tablet

## 2017-08-29 NOTE — IP AVS SNAPSHOT
[address] [address] Patient: Julio Zuluaga MRN: [id number] GMB:3/30/1961 You are allergic to the following No active allergies Recent Documentation Height Weight BMI Smoking Status 1.803 m 91.1 kg 28.01 kg/m2 Never Smoker Unresulted Labs Order Current Status STONE ANALYSIS, URINARY In process Emergency Contacts Name Discharge Info Relation Home Work Mobile Soraya Elizondo DISCHARGE CAREGIVER [3] Spouse [3] [phone] Silvia PérezJane Elizondo  Spouse [3] [phone] About your hospitalization You were admitted on:  August 29, 2017 You last received care in the:  [address] [place] [address] You were discharged on:  September 1, 2017 Unit phone number:  [phone] Why you were hospitalized Your primary diagnosis was:  Not on File Your diagnoses also included:  Acute Blood Loss Anemia, Gross Hematuria, Diabetes Mellitus Type 2, Controlled (Hcc), Kidney Stones, Cad (Coronary Artery Disease) Providers Seen During Your Hospitalizations Provider Role Specialty Primary office phone Alysa Collier MD Attending Provider Internal Medicine [phone] Your Primary Care Physician (PCP) Primary Care Physician Office Phone Office Fax Mark Maier [phone] Follow-up Information Follow up With Details Comments Contact Info Get Anglin MD On 9/7/2017 @ 9:30 am [address] [facility] [address] [facility] [address] 
[phone] Shante Steiner MD On 9/1/2017  [address] [place] [address] 
[phone] Current Discharge Medication List  
  
START taking these medications Dose & Instructions Dispensing Information Comments Morning Noon Evening Bedtime  
 oxyCODONE-acetaminophen 5-325 mg per tablet Commonly known as:  PERCOCET Your last dose was: Your next dose is:    
   
   
 Dose:  1 Tab Take 1 Tab by mouth every four (4) hours as needed. Max Daily Amount: 6 Tabs. Quantity:  20 Tab Refills:  0 CONTINUE these medications which have NOT CHANGED Dose & Instructions Dispensing Information Comments Morning Noon Evening Bedtime  
 fenofibrate 160 mg tablet Commonly known as:  LOFIBRA Your last dose was: Your next dose is:    
   
   
  Refills:  0 JANUMET XR 50-1,000 mg Tm24 Generic drug:  SITagliptin-metFORMIN Your last dose was: Your next dose is:    
   
   
  Refills:  5  
     
   
   
   
  
 losartan 25 mg tablet Commonly known as:  COZAAR Your last dose was: Your next dose is:    
   
   
  Refills:  0 Omega-3-DHA-EPA-Fish Oil 1,000 mg (120 mg-180 mg) Cap Your last dose was: Your next dose is:    
   
   
 Dose:  1000 mg Take 1,000 mg by mouth two (2) times a day. Indications: cholesterol Refills:  0  
     
   
   
   
  
 tamsulosin 0.4 mg capsule Commonly known as:  FLOMAX Your last dose was: Your next dose is:    
   
   
 Dose:  0.4 mg Take 1 Cap by mouth daily (after dinner). Quantity:  40 Cap Refills:  0  
     
   
   
   
  
 VITAMIN D2 50,000 unit capsule Generic drug:  ergocalciferol Your last dose was: Your next dose is:    
   
   
 Dose:  50448 Units Take 50,000 Units by mouth Every Mon, Wed & Sun. Indications: VITAMIN D DEFICIENCY (HIGH DOSE THERAPY) Refills:  0 STOP taking these medications   
 rivaroxaban 20 mg Tab tablet Commonly known as:  Ladan Humphries Where to Get Your Medications Information on where to get these meds will be given to you by the nurse or doctor. ! Ask your nurse or doctor about these medications  
  oxyCODONE-acetaminophen 5-325 mg per tablet Discharge Instructions Angina: Care Instructions Your Care Instructions You have a problem called angina. Angina happens when there is not enough blood flow to your heart muscle. Angina is a sign of coronary artery disease (CAD). CAD occurs when blood vessels that supply the heart become narrowed. Having CAD increases your risk of a heart attack. Chest pain or pressure is the most common symptom of angina. But some people have other symptoms, like: 
· Pain, pressure, or a strange feeling in the back, neck, jaw, or upper belly, or in one or both shoulders or arms. · Shortness of breath. · Nausea or vomiting. · Lightheadedness or sudden weakness. · Fast or irregular heartbeat. Women are somewhat more likely than men to have angina symptoms like shortness of breath, nausea, and back or jaw pain. Angina can be dangerous. That's why it is important to pay attention to your symptoms. Know what is typical for you, learn how to control your symptoms, and understand when you need to get treatment. A change in your usual pattern of symptoms is an emergency. It may mean that you are having a heart attack. The doctor has checked you carefully, but problems can develop later. If you notice any problems or new symptoms, get medical treatment right away. Follow-up care is a key part of your treatment and safety. Be sure to make and go to all appointments, and call your doctor if you are having problems. It's also a good idea to know your test results and keep a list of the medicines you take. How can you care for yourself at home? Medicines · If your doctor has given you nitroglycerin for angina symptoms, keep it with you at all times. If you have symptoms, sit down and rest, and take the first dose of nitroglycerin as directed. If your symptoms get worse or are not getting better within 5 minutes, call 911 right away. Stay on the phone. The emergency  will give you further instructions. · If your doctor advises it, take 1 low-dose aspirin a day to prevent heart attack. · Be safe with medicines. Take your medicines exactly as prescribed. Call your doctor if you think you are having a problem with your medicine. You will get more details on the specific medicines your doctor prescribes. Lifestyle changes · Do not smoke. If you need help quitting, talk to your doctor about stop-smoking programs and medicines. These can increase your chances of quitting for good. · Eat a heart-healthy diet that is low in saturated fat and salt, and is high in fiber. Talk to your doctor or a dietitian about healthy eating. · Stay at a healthy weight. Or lose weight if you need to. Activity · Talk to your doctor about a level of activity that is safe for you. · If an activity causes angina symptoms, stop and rest. 
When should you call for help? Call 911 anytime you think you may need emergency care. For example, call if: 
· You passed out (lost consciousness). · You have symptoms of a heart attack. These may include: ¨ Chest pain or pressure, or a strange feeling in the chest. 
¨ Sweating. ¨ Shortness of breath. ¨ Nausea or vomiting. ¨ Pain, pressure, or a strange feeling in the back, neck, jaw, or upper belly or in one or both shoulders or arms. ¨ Lightheadedness or sudden weakness. ¨ A fast or irregular heartbeat. After you call 911, the  may tell you to chew 1 adult-strength or 2 to 4 low-dose aspirin. Wait for an ambulance. Do not try to drive yourself. · You have angina symptoms that do not go away with rest or are not getting better within 5 minutes after you take a dose of nitroglycerin. Call your doctor now or seek immediate medical care if: 
· You are having angina symptoms more often than usual, or they are different or worse than usual. 
· You feel dizzy or lightheaded, or you feel like you may faint.  
Watch closely for changes in your health, and be sure to contact your doctor if you have any problems. Where can you learn more? Go to http://tamanna-darwin.info/. Enter H129 in the search box to learn more about \"Angina: Care Instructions. \" Current as of: April 3, 2017 Content Version: 11.3 © 0838-7091 WeSwap.com. Care instructions adapted under license by [employer] (which disclaims liability or warranty for this information). If you have questions about a medical condition or this instruction, always ask your healthcare professional. [place] [address] any warranty or liability for your use of this information. Blood in the Urine: Care Instructions Your Care Instructions Blood in the urine, or hematuria, may make the urine look red, brown, or pink. There may be blood every time you urinate or just from time to time. You cannot always see blood in the urine, but it will show up in a urine test. 
Blood in the urine may be serious. It should always be checked by a doctor. Your doctor may recommend more tests, including an X-ray, a CT scan, or a cystoscopy (which lets a doctor look inside the urethra and bladder). Blood in the urine can be a sign of another problem. Common causes are bladder infections and kidney stones. An injury to your groin or your genital area can also cause bleeding in the urinary tract. Very hard exercisesuch as running a marathoncan cause blood in the urine. Blood in the urine can also be a sign of kidney disease or cancer in the bladder or kidney. Many cases of blood in the urine are caused by a harmless condition that runs in families. This is called benign familial hematuria. It does not need any treatment. Sometimes your urine may look red or brown even though it does not contain blood.  For example, not getting enough fluids (dehydration), taking certain medicines, or having a liver problem can change the color of your urine. Eating foods such as beets, rhubarb, or blackberries or foods with red food coloring can make your urine look red or pink. Follow-up care is a key part of your treatment and safety. Be sure to make and go to all appointments, and call your doctor if you are having problems. It's also a good idea to know your test results and keep a list of the medicines you take. When should you call for help? Call your doctor now or seek immediate medical care if: 
· You have symptoms of a urinary infection. For example: ¨ You have pus in your urine. ¨ You have pain in your back just below your rib cage. This is called flank pain. ¨ You have a fever, chills, or body aches. ¨ It hurts to urinate. ¨ You have groin or belly pain. · You have more blood in your urine. Watch closely for changes in your health, and be sure to contact your doctor if: 
· You have new urination problems. · You do not get better as expected. Where can you learn more? Go to http://tamanna-darwin.info/. Enter N948 in the search box to learn more about \"Blood in the Urine: Care Instructions. \" Current as of: March 20, 2017 Content Version: 11.3 © 6644-8699 StreamBase Systems. Care instructions adapted under license by [employer] Sist[employer] (which disclaims liability or warranty for this information). If you have questions about a medical condition or this instruction, always ask your healthcare professional. [place] [address] any warranty or liability for your use of this information. Patient armband removed and shredded MyChart Activation Thank you for requesting access to AdMob. Please follow the instructions below to securely access and download your online medical record. AdMob allows you to send messages to your doctor, view your test results, renew your prescriptions, schedule appointments, and more. How Do I Sign Up? 1. In your internet browser, go to [email or web address] 
 2. Click on the First Time User? Click Here link in the Sign In box. You will be redirect to the New Member Sign Up page. 3. Enter your Ziptask Access Code exactly as it appears below. You will not need to use this code after youve completed the sign-up process. If you do not sign up before the expiration date, you must request a new code. Ziptask Access Code: [email or web address] Expires: 10/16/2017 12:44 PM (This is the date your Ziptask access code will ) 4. Enter the last four digits of your Social Security Number (xxxx) and Date of Birth (mm/dd/yyyy) as indicated and click Submit. You will be taken to the next sign-up page. 5. Create a Ziptask ID. This will be your Ziptask login ID and cannot be changed, so think of one that is secure and easy to remember. 6. Create a Ziptask password. You can change your password at any time. 7. Enter your Password Reset Question and Answer. This can be used at a later time if you forget your password. 8. Enter your e-mail address. You will receive e-mail notification when new information is available in [address] 9. Click Sign Up. You can now view and download portions of your medical record. 10. Click the Download Summary menu link to download a portable copy of your medical information. Additional Information If you have questions, please visit the Frequently Asked Questions section of the Ziptask website at [email or web address] Remember, Ziptask is NOT to be used for urgent needs. For medical emergencies, dial 911. DISCHARGE SUMMARY from Nurse The following personal items are in your possession at time of discharge: 
 
Dental Appliances: None Visual Aid: Glasses, With patient Jewelry: Ring Other Valuables: Cell Phone PATIENT INSTRUCTIONS: 
 
 
F-face looks uneven A-arms unable to move or move unevenly S-speech slurred or non-existent T-time-call 911 as soon as signs and symptoms begin-DO NOT go Back to bed or wait to see if you get better-TIME IS BRAIN. Warning Signs of HEART ATTACK Call 911 if you have these symptoms: 
? Chest discomfort. Most heart attacks involve discomfort in the center of the chest that lasts more than a few minutes, or that goes away and comes back. It can feel like uncomfortable pressure, squeezing, fullness, or pain. ? Discomfort in other areas of the upper body. Symptoms can include pain or discomfort in one or both arms, the back, neck, jaw, or stomach. ? Shortness of breath with or without chest discomfort. ? Other signs may include breaking out in a cold sweat, nausea, or lightheadedness. Don't wait more than five minutes to call [address]! Fast action can save your life. Calling 911 is almost always the fastest way to get lifesaving treatment. Emergency Medical Services staff can begin treatment when they arrive  up to an hour sooner than if someone gets to the hospital by car. The discharge information has been reviewed with the patient. The patient verbalized understanding. Discharge medications reviewed with the patient and appropriate educational materials and side effects teaching were provided. Discharge Orders Procedure Order Date Status Priority Quantity Spec Type Associated Dx NURSING-MISCELLANEOUS: Xarelto Instruct patient to resume Xarelto in 48 hours if no further hematuria noted. 09/01/17 1146 Normal Routine 1 Comments: Instruct patient to resume Xarelto in 48 hours if no further hematuria noted. Questions: Description of Order:  Xarelto Introducing [facility] HE[facility]! [facility] introduces Utel patient portal. Now you can access parts of your medical record, email your doctor's office, and request medication refills online. 1. In your internet browser, go to [email or web address] 2. Click on the First Time User? Click Here link in the Sign In box. You will see the New Member Sign Up page. 3. Enter your Utel Access Code exactly as it appears below. You will not need to use this code after youve completed the sign-up process. If you do not sign up before the expiration date, you must request a new code. · Utel Access Code: [email or web address] Expires: 10/16/2017 12:44 PM 
 
4. Enter the last four digits of your Social Security Number (xxxx) and Date of Birth (mm/dd/yyyy) as indicated and click Submit. You will be taken to the next sign-up page. 5. Create a Utel ID. This will be your Utel login ID and cannot be changed, so think of one that is secure and easy to remember. 6. Create a Utel password. You can change your password at any time. 7. Enter your Password Reset Question and Answer. This can be used at a later time if you forget your password. 8. Enter your e-mail address. You will receive e-mail notification when new information is available in [address] 9. Click Sign Up. You can now view and download portions of your medical record. 10. Click the Download Summary menu link to download a portable copy of your medical information. If you have questions, please visit the Frequently Asked Questions section of the Utel website. Remember, Utel is NOT to be used for urgent needs. For medical emergencies, dial 911. Now available from your iPhone and Android! General Information Please provide this summary of care documentation to your next provider.  
  
  
    
    
 Patient Signature: ____________________________________________________________ Date:  ____________________________________________________________  
  
Jagdish Snipe Provider Signature:  ____________________________________________________________ Date:  ____________________________________________________________

## 2017-08-30 ENCOUNTER — ANESTHESIA EVENT (OUTPATIENT)
Dept: SURGERY | Age: 62
DRG: 660 | End: 2017-08-30
Payer: COMMERCIAL

## 2017-08-30 LAB
ALBUMIN SERPL-MCNC: 3.3 G/DL (ref 3.4–5)
ALBUMIN/GLOB SERPL: 1.1 {RATIO} (ref 0.8–1.7)
ALP SERPL-CCNC: 52 U/L (ref 45–117)
ALT SERPL-CCNC: 27 U/L (ref 16–61)
ANION GAP SERPL CALC-SCNC: 7 MMOL/L (ref 3–18)
AST SERPL-CCNC: 16 U/L (ref 15–37)
BASOPHILS # BLD: 0 K/UL (ref 0–0.1)
BASOPHILS NFR BLD: 1 % (ref 0–2)
BILIRUB SERPL-MCNC: 0.8 MG/DL (ref 0.2–1)
BUN SERPL-MCNC: 19 MG/DL (ref 7–18)
BUN/CREAT SERPL: 15 (ref 12–20)
CALCIUM SERPL-MCNC: 9.6 MG/DL (ref 8.5–10.1)
CHLORIDE SERPL-SCNC: 108 MMOL/L (ref 100–108)
CO2 SERPL-SCNC: 24 MMOL/L (ref 21–32)
CREAT SERPL-MCNC: 1.28 MG/DL (ref 0.6–1.3)
DIFFERENTIAL METHOD BLD: ABNORMAL
EOSINOPHIL # BLD: 0.1 K/UL (ref 0–0.4)
EOSINOPHIL NFR BLD: 3 % (ref 0–5)
ERYTHROCYTE [DISTWIDTH] IN BLOOD BY AUTOMATED COUNT: 13.7 % (ref 11.6–14.5)
GLOBULIN SER CALC-MCNC: 2.9 G/DL (ref 2–4)
GLUCOSE BLD STRIP.AUTO-MCNC: 129 MG/DL (ref 70–110)
GLUCOSE BLD STRIP.AUTO-MCNC: 134 MG/DL (ref 70–110)
GLUCOSE SERPL-MCNC: 176 MG/DL (ref 74–99)
HCT VFR BLD AUTO: 27.7 % (ref 36–48)
HGB BLD-MCNC: 9.1 G/DL (ref 13–16)
LYMPHOCYTES # BLD: 0.9 K/UL (ref 0.9–3.6)
LYMPHOCYTES NFR BLD: 20 % (ref 21–52)
MCH RBC QN AUTO: 26.9 PG (ref 24–34)
MCHC RBC AUTO-ENTMCNC: 32.9 G/DL (ref 31–37)
MCV RBC AUTO: 82 FL (ref 74–97)
MONOCYTES # BLD: 0.4 K/UL (ref 0.05–1.2)
MONOCYTES NFR BLD: 10 % (ref 3–10)
NEUTS SEG # BLD: 3 K/UL (ref 1.8–8)
NEUTS SEG NFR BLD: 66 % (ref 40–73)
PLATELET # BLD AUTO: 320 K/UL (ref 135–420)
PMV BLD AUTO: 9.4 FL (ref 9.2–11.8)
POTASSIUM SERPL-SCNC: 4.1 MMOL/L (ref 3.5–5.5)
PROT SERPL-MCNC: 6.2 G/DL (ref 6.4–8.2)
RBC # BLD AUTO: 3.38 M/UL (ref 4.7–5.5)
SODIUM SERPL-SCNC: 139 MMOL/L (ref 136–145)
WBC # BLD AUTO: 4.5 K/UL (ref 4.6–13.2)

## 2017-08-30 PROCEDURE — 85025 COMPLETE CBC W/AUTO DIFF WBC: CPT | Performed by: INTERNAL MEDICINE

## 2017-08-30 PROCEDURE — 65270000029 HC RM PRIVATE

## 2017-08-30 PROCEDURE — 36415 COLL VENOUS BLD VENIPUNCTURE: CPT | Performed by: INTERNAL MEDICINE

## 2017-08-30 PROCEDURE — 74011250636 HC RX REV CODE- 250/636: Performed by: UROLOGY

## 2017-08-30 PROCEDURE — 82962 GLUCOSE BLOOD TEST: CPT

## 2017-08-30 PROCEDURE — 93971 EXTREMITY STUDY: CPT

## 2017-08-30 PROCEDURE — 74011000250 HC RX REV CODE- 250: Performed by: INTERNAL MEDICINE

## 2017-08-30 PROCEDURE — 80053 COMPREHEN METABOLIC PANEL: CPT | Performed by: INTERNAL MEDICINE

## 2017-08-30 RX ORDER — SODIUM CHLORIDE, SODIUM LACTATE, POTASSIUM CHLORIDE, CALCIUM CHLORIDE 600; 310; 30; 20 MG/100ML; MG/100ML; MG/100ML; MG/100ML
100 INJECTION, SOLUTION INTRAVENOUS CONTINUOUS
Status: DISCONTINUED | OUTPATIENT
Start: 2017-08-31 | End: 2017-09-01 | Stop reason: HOSPADM

## 2017-08-30 RX ORDER — OXYCODONE AND ACETAMINOPHEN 5; 325 MG/1; MG/1
1 TABLET ORAL
Status: DISCONTINUED | OUTPATIENT
Start: 2017-08-30 | End: 2017-09-01 | Stop reason: HOSPADM

## 2017-08-30 RX ORDER — CEFAZOLIN SODIUM 2 G/50ML
2 SOLUTION INTRAVENOUS
Status: DISCONTINUED | OUTPATIENT
Start: 2017-08-31 | End: 2017-09-01 | Stop reason: HOSPADM

## 2017-08-30 RX ORDER — DOCUSATE SODIUM 100 MG/1
100 CAPSULE, LIQUID FILLED ORAL DAILY
Status: DISCONTINUED | OUTPATIENT
Start: 2017-08-31 | End: 2017-09-01 | Stop reason: HOSPADM

## 2017-08-30 RX ADMIN — SODIUM CHLORIDE, SODIUM LACTATE, POTASSIUM CHLORIDE, AND CALCIUM CHLORIDE 100 ML/HR: 600; 310; 30; 20 INJECTION, SOLUTION INTRAVENOUS at 23:41

## 2017-08-30 RX ADMIN — FAMOTIDINE 20 MG: 10 INJECTION INTRAVENOUS at 00:22

## 2017-08-30 RX ADMIN — FAMOTIDINE 20 MG: 10 INJECTION INTRAVENOUS at 21:16

## 2017-08-30 RX ADMIN — FAMOTIDINE 20 MG: 10 INJECTION INTRAVENOUS at 09:26

## 2017-08-30 NOTE — ROUTINE PROCESS
Received patient in bed, awake, alert and oriented. No complaints made, will continue to monitor. Report  Given by JED Pak RN.  6:36 AM  To OR per bed, awake alert and oriented. IV fluid infusing, accompanied by wife.

## 2017-08-30 NOTE — CONSULTS
No diagnosis found. ASSESSMENT:   5.6mm right UPJ stone - S/p right stent placement on 7/21/2017                        CT scan 6/28/2017: Right: 5.6 mm at renal pelvis, as well as 4 additional stones; Left: 4mm stone in the upper pole                                       62yoM hx nephrolithiasis s/p Right ureteral stent 7/21/17 present with gross hematuria and anemia transferred from [facility]    Hx of DVT on Xarelto      PLAN:      CT reviewed  Stent in good position   No RP kidney hemorrhage  U Cx  Trend Hbg  Check PVR  Would assess for other sources of anemia. With minimal clots red urine is expected with stent in place on Xarelto   Will follow      Jose Luis Montano MD  [facility]  [address] Kareen Dunlap [facility], [address] [place] [address]      Cc: Gross hematuria    HISTORY OF PRESENT ILLNESS:  Obi Chakraborty is a 58 y.o. male who is seen in consultation as referred by Dr Leon Resendez for Gross hematuria     Obi Chakraborty is a 58 y.o.  male who presented to PCP ofc today b/c he'd been feeling fatigued. H/H reported as abnormally low and Pt was transferred to [facility] ER for PRBC transfusion. In May 2017, Pt presented for elective L quadricept tendon repair. In July 2017, Pt c/o abd pain and was dx with nephrolithiasis with obstructive uropathy and underwent R ureteral stent placement 7/21. During the same hospitalization Pt was dx with acute DVT in the LLE. He was started on Xarelto and reports that he's had daily hematuria since July 27th. While in the ER, VSS. Pt was transfused 2 Units PRBC and transferred to [facility] for further mgmt. No fever, no chills. AUA Symptom Score 8/7/2017   Over the past month how often have you had the sensation that your bladder was not completely empty after you finished urinating?  3   Over the past month, how often have had to urinate again less than 2 hours after you last finished urinating? 3   Over the past month, how often have you found you stopped and started again several times when you urinated? 1   Over the past month, how often have you found it difficult to postpone urination? 5   Over the past month, how often have you had a weak urinary stream? 1   Over the past month, how often have you had to push or strain to begin urinating? 0   Over the past month, how many times did you most typically get up to urinate from the time you went to bed at night until the time you got up in the morning? 2   AUA Score 15   If you were to spend the rest of your life with your urinary condition the way it is now, how would you feel about that?  Mostly dissatisfied         Past Medical History:   Diagnosis Date    Diabetes ([address]     Gall stones     Hematuria     Hypertension     Kidney stone        Past Surgical History:   Procedure Laterality Date    HX COLONOSCOPY      HX OTHER SURGICAL      knee surgery       Social History   Substance Use Topics    Smoking status: Never Smoker    Smokeless tobacco: Never Used    Alcohol use No       No Known Allergies    Family History   Problem Relation Age of Onset    Colon Cancer Mother        Current Facility-Administered Medications   Medication Dose Route Frequency Provider Last Rate Last Dose    morphine injection 2 mg  2 mg IntraVENous Q4H PRN Reina Hargrove MD        ondansetron [facility]) injection 4 mg  4 mg IntraVENous Q4H PRN Reina Hargrove MD        glucose chewable tablet 16 g  16 g Oral PRN Reina Hargrove MD        glucagon (GLUCAGEN) injection 1 mg  1 mg IntraMUSCular PRN Reina Hargrove MD        dextrose (D50W) injection syrg 12.5-25 g  25-50 mL IntraVENous PRN Reina Hargrove MD        insulin lispro (HUMALOG) injection   SubCUTAneous AC&HS Reina Hargrove MD        famotidine (PF) (PEPCID) injection 20 mg  20 mg IntraVENous Q12H Reina Hargrove MD   20 mg at 08/30/17 0022       Review of Systems  Constitutional: Fever:  negative  Skin: Rash:  negative  HEENT: Hearing difficulty:  negative  Eyes: Blurred vision:  negative  Cardiovascular: Chest pain:  negative  Respiratory: Shortness of breath:  negative  Gastrointestinal: Nausea/vomiting:  negative  Musculoskeletal: Back pain:  negative  Neurological: Weakness:  negative  Psychological: Memory loss:  negative  Comments/additional findings:  negative          PHYSICAL EXAMINATION:   There were no vitals taken for this visit. Constitutional: Well developed, well nourished male. No acute distress. HEENT: Normocephalic, Atraumatic, EOM's intact   CV:  Radial pulse is intact bilaterally. No peripheral swelling noted   Respiratory: No respiratory distress or difficulties breathing   Abdomen:  No abdominal masses or tenderness. No CVA tenderness. No inguinal hernias noted.  Male:  No CVA tenderness  Skin: No evidence of jaundice. Normal color  Neuro/Psych:  Alert and oriented. Affect appropriate. Lymphatic:   No enlarged inguinal lymph nodes. REVIEW OF LABS AND IMAGING:      Labs: Results:   Chemistry  No results for input(s): GLU, NA, K, CL, CO2, BUN, CREA, CA, AGAP, BUCR, TBIL, GPT, AP, TP, ALB, GLOB, AGRAT in the last 72 hours. CBC w/Diff No results for input(s): WBC, RBC, HGB, HCT, PLT, GRANS, LYMPH, EOS, HGBEXT, HCTEXT, PLTEXT in the last 72 hours. Cultures No results for input(s): CULT in the last 72 hours. All Micro Results     None            Urinalysis Potassium   Date Value Ref Range Status   07/24/2017 4.2 3.5 - 5.5 mmol/L Final     Creatinine   Date Value Ref Range Status   07/24/2017 1.23 0.6 - 1.3 MG/DL Final     BUN   Date Value Ref Range Status   07/24/2017 18 7.0 - 18 MG/DL Final      PSA No results for input(s): PSA in the last 72 hours.    Coagulation Lab Results   Component Value Date/Time    aPTT 32.6 07/23/2017 01:00 PM    aPTT 95.9 07/23/2017 06:45 AM           8/29/17  Hbg 7.6  Cr 1.2    CT 8/29: reviewed - in chart

## 2017-08-30 NOTE — PROGRESS NOTES
Urology Progress Note        Assessment/Plan:     Patient Active Problem List   Diagnosis Code    Kidney stones N20.0    Diabetes mellitus type 2, controlled ([facility] [address] E11.9    Renal stones N20.0    Acute blood loss anemia D62    Gross hematuria R31.0    CAD (coronary artery disease) I25.10     ASSESSMENT:  1. Acute blood loss anemia  2. Right UPJ 6mm stone s/p Stent 17  3. Hematuria  4. LLE DVT on Xarelto        Plan:    1. Reponded appropriately to 2u prbc yesterday  2. Hold Xarelto  3. OR tomorrow for Rt URS, laser litho, stent exchange  4. NPO after midnight  5. Consider US LLE duplex to eval DVT  6. Periop Abx (Ancef)  7. Urine culture    MD Hosea Najera MD  Urologist / Female Urologist  [facility] Location:  [address] [place], [place] [address] [address]  Office: [phone]  Pager: [phone]          Subjective:     Daily Progress Note: 2017 1:21 PM    Rock Cancino is doing good. He denies any pain. I   He is tolerating a solid diet and ambulating without assistance. Patient is voiding without difficulty. Reports feeling less fatigue. Tolerated prbc transfusion yesterday. Per pt, cardiologist wants Xarelto for 3-6 mo for DVT. Patient would like to have a LE duplex to eval DVT. Hematuria much improved today.        Objective:     Visit Vitals    /73 (BP 1 Location: Right arm, BP Patient Position: At rest)    Pulse 79    Temp 98.3 °F (36.8 °C)    Resp 20    Wt 200 lb 8 oz (90.9 kg)    SpO2 98%    BMI 27.19 kg/m2        Temp (24hrs), Av °F (36.7 °C), Min:97.1 °F (36.2 °C), Max:98.3 °F (36.8 °C)      Intake and Output:   1901 -  0700  In: 360 [P.O.:360]  Out: 1025 [Urine:1025]   0701 -  1900  In: 360 [P.O.:360]  Out: 750 [Urine:750]    PHYSICAL EXAMINATION:   Visit Vitals    /73 (BP 1 Location: Right arm, BP Patient Position: At rest)    Pulse 79    Temp 98.3 °F (36.8 °C)    Resp 20    Wt 200 lb 8 oz (90.9 kg)    SpO2 98%    BMI 27.19 kg/m2     Constitutional: Well developed, well nourished. No acute distress. HEENT: Normocephalic, Atraumatic, EOM's intact   CV:  RRR  Respiratory: No respiratory distress or difficulties breathing   Abdomen:  Soft, non-tender, non-distended   Male:   normal  Skin: No evidence of jaundice. Normal color  Neuro/Psych:  Alert and oriented. Affect appropriate. Lymphatic:   No enlarged inguinal lymph nodes. Lab/Data Review:  Lab results reviewed. For significant abnormal values and values requiring intervention, see assessment and plan. Labs:     Labs: Results:   Chemistry    Recent Labs      08/30/17   0345   GLU  176*   NA  139   K  4.1   CL  108   CO2  24   BUN  19*   CREA  1.28   CA  9.6   AGAP  7   BUCR  15   AP  52   TP  6.2*   ALB  3.3*   GLOB  2.9   AGRAT  1.1      CBC w/Diff Recent Labs      08/30/17   0345   WBC  4.5*   RBC  3.38*   HGB  9.1*   HCT  27.7*   PLT  320   GRANS  66   LYMPH  20*   EOS  3      Cultures No results for input(s): CULT in the last 72 hours. All Micro Results     Procedure Component Value Units Date/Time    CULTURE, URINE [[id number]]     Order Status:  Sent Specimen:  Urine from Clean catch             Urinalysis Potassium   Date Value Ref Range Status   08/30/2017 4.1 3.5 - 5.5 mmol/L Final     Creatinine   Date Value Ref Range Status   08/30/2017 1.28 0.6 - 1.3 MG/DL Final     BUN   Date Value Ref Range Status   08/30/2017 19 (H) 7.0 - 18 MG/DL Final      PSA No results for input(s): PSA in the last 72 hours.    Coagulation Lab Results   Component Value Date/Time    aPTT 32.6 07/23/2017 01:00 PM    aPTT 95.9 07/23/2017 06:45 AM

## 2017-08-30 NOTE — H&P
History & Physical    Patient: Lamin Adan MRN: [id number]  CSN: [id number]    YOB: 1955  Age: 58 y.o. Sex: male      DOA: 8/29/2017    Chief Complaint: No chief complaint on file. fatigue and pale       HPI:     Lamin Adan is a 58 y.o.  male who presented to PCP ofc today b/c he'd been feeling fatigued. H/H reported as abnormally low and Pt was transferred to [facility] ER for PRBC transfusion. In May 2017, Pt presented for elective L quadricept tendon repair. In July 2017, Pt c/o abd pain and was dx with nephrolithiasis with obstructive uropathy and underwent R ureteral stent placement. During the same hospitalization Pt was dx with acute DVT in the LLE. He was started on Xarelto and reports that he's had daily hematuria since July 27th. While in the ER, VSS. Pt was transfused 2 Units PRBC and transferred to [facility] for further mgmt. Past Medical History:   Diagnosis Date    Diabetes ([address]     Gall stones     Hematuria     Hypertension     Kidney stone        Past Surgical History:   Procedure Laterality Date    HX COLONOSCOPY      HX OTHER SURGICAL      knee surgery       Family History   Problem Relation Age of Onset    Colon Cancer Mother        Social History     Social History    Marital status:      Spouse name: N/A    Number of children: N/A    Years of education: N/A     Social History Main Topics    Smoking status: Never Smoker    Smokeless tobacco: Never Used    Alcohol use No    Drug use: No    Sexual activity: Not on file     Other Topics Concern    Not on file     Social History Narrative       Prior to Admission medications    Medication Sig Start Date End Date Taking?  Authorizing Provider   ergocalciferol (VITAMIN D2) 50,000 unit capsule Take 50,000 Units by mouth Every Mon, Wed & Sun. Indications: VITAMIN D DEFICIENCY (HIGH DOSE THERAPY) 8/28/17  Yes Historical Provider   Omega-3-DHA-EPA-Fish Oil 1,000 mg (120 mg-180 mg) cap Take 1,000 mg by mouth two (2) times a day. Indications: cholesterol 8/29/17  Yes Historical Provider   rivaroxaban (XARELTO) 20 mg tab tablet Take 1 Tab by mouth daily (with breakfast). Begin this prescription after 15 mg BID supply of medication is exhausted. 7/24/17  Yes Maximo Beaver MD   fenofibrate (LOFIBRA) 160 mg tablet  6/24/17  Yes Historical Provider   losartan (COZAAR) 25 mg tablet  6/24/17  Yes Historical Provider   JANUMET XR 50-1,000 mg TM24  7/8/17  Yes Historical Provider   tamsulosin (FLOMAX) 0.4 mg capsule Take 1 Cap by mouth daily (after dinner). 7/18/17  Yes Kirt Walsh MD       No Known Allergies      Review of Systems  GENERAL: Patient alert, awake and oriented times 3, able to communicate full sentences and not in distress. HEENT: No change in vision, no earache, tinnitus, sore throat or sinus congestion. NECK: No pain or stiffness. PULMONARY: No shortness of breath, cough or wheeze. Cardiovascular: no pnd or orthopnea, no CP  GASTROINTESTINAL: intermittent abdominal pain, nausea, vomiting or diarrhea, melena or bright red blood per rectum. GENITOURINARY: No urinary frequency, urgency, hesitancy or dysuria. MUSCULOSKELETAL: No joint or muscle pain, c/o swelling in L knee, no back pain, no recent trauma. DERMATOLOGIC: No rash, no itching, no lesions. ENDOCRINE: No polyuria, polydipsia, no heat or cold intolerance. No recent change in weight. HEMATOLOGICAL: No anemia or easy bruising or bleeding. NEUROLOGIC: No headache, seizures, numbness, tingling or weakness. Physical Exam:     Physical Exam:  There were no vitals taken for this visit. No data recorded. General:  Alert, pale, cooperative, no distress, appears stated age. Head: Normocephalic, without obvious abnormality, atraumatic. Eyes:  Conjunctivae/corneas clear. PERRL, EOMs intact. Nose: Nares normal. No drainage or sinus tenderness.    Neck: Supple, symmetrical, trachea midline, no adenopathy, thyroid: no enlargement, no carotid bruit and no JVD. Lungs:   Clear to auscultation bilaterally. Heart:  Regular rate and rhythm, S1, S2 normal.     Abdomen: Soft, non-tender. Bowel sounds normal.    Extremities: Mild L knee swelling, atraumatic, no cyanosis. Pulses: 2+ and symmetric all extremities. Skin:  No rashes or lesions   Neurologic: AAOx3, No focal motor or sensory deficit. Labs Reviewed:    Lab results reviewed. For significant abnormal values and values requiring intervention, see assessment and plan. EKG    Procedures/imaging: see electronic medical records for all procedures/Xrays and details which were not copied into this note but were reviewed prior to creation of Plan      Assessment/Plan     Active Problems:    Kidney stones (7/19/2017)      Diabetes mellitus type 2, controlled ([facility] [address] (7/19/2017)      Acute blood loss anemia (8/29/2017)      Gross hematuria (8/29/2017)      CAD (coronary artery disease) (8/29/2017)    Pt presented in stable condition, downgraded to Telemetry. Urologist Dr Blanca Pandya will consult. Pt took Xarelto today which has exacerbated the hematuria since its July and is the reason for the acute anemia. Holding Xarelto. NPO after MN for possible procedure in am. If stent remains suggest IVC filter until Pt can be anticoagulated with hemorrhaging. Pt did report that Lithotripsy has been scheduled [facility] for Sept 9th. Pt has h/o CAD RCA but states recent stress test was neg and that Pt has been cleared for the lithotripsy procedure. He is not c/o CP nor SOB. Suggest keep Hgb>10. Repeat CBC, BMP in am. DM managed with accucheck and SSI coverage. DVT/GI Prophylaxis: H2B/PPI and Xarelto    Discussed with patient at bedside about hospital admission and my plan care, he understood and agree with my plan care.     Katelynn Breen MD  8/29/2017 10:26 PM

## 2017-08-30 NOTE — PROCEDURES
[facility]  *** FINAL REPORT ***    Name: Martita Borden  MRN: [id number]    Inpatient  : 1955  HIS Order #: [id number]  [address] [facility] Visit #: [id number]  Date: 30 Aug 2017    TYPE OF TEST: Peripheral Venous Testing    REASON FOR TEST    Left Leg:-  Deep venous thrombosis:           Yes  Proximal extent of thrombus:      Mid Femoral  Superficial venous thrombosis:    No  Deep venous insufficiency:        Not examined  Superficial venous insufficiency: Not examined      INTERPRETATION/FINDINGS  Left leg :  1. Chronic non-occlusive deep venous thrombosis identified in the  femoral and popliteal veins. 2. Deep veins visualized include the common femoral, femoral,  popliteal, posterior tibial and peroneal veins. 3. No evidence of superficial thrombosis detected. 4. Superficial veins visualized include the proximal great saphenous  vein. 5. No evidence of deep vein thrombosis in the contralateral common  femoral vein. ADDITIONAL COMMENTS  Compared to previous exam on 17, there has been resolution of the   left common femoral thrombus and the thrombus of the left popliteal  and femoral veins is now chronic. I have personally reviewed the data relevant to the interpretation of  this  study. TECHNOLOGIST: Fide Zayas RVT  Signed: 2017 03:43 PM    PHYSICIAN: Khurram Willard D.O.   Signed: 2017 09:16 AM

## 2017-08-30 NOTE — PROGRESS NOTES
[facility] Hospitalist Group  Progress Note    Patient: Yen Guerrero Age: 58 y.o. : 1955 MR#: [id number] SSN: [id number]  Date/Time: 2017 10:23 AM    Subjective/24-hour events:     Feels well - no chest pain or SOB. Family present at bedside. Assessment:   Hematuria  Acute blood loss anemia s/p transfusion 2 units PRBCs prior to transfer  R UPJ stone s/p recent stent placement  LLE DVT on NOAC therapy (xarelto)  CAD hx    Plan:  Continue to hold xarelto. Monitor H/H s/p transfusion. F/U LLE venous PVL. Await urology recs re: definitive stone management - was scheduled for outpatient lithotripsy next month at [facility]. Further recs pending above. Case discussed with:  [x]Patient  [x]Family  []Nursing  []Case Management  DVT Prophylaxis:  []Lovenox  []Hep SQ  []SCDs  []Coumadin   []On Heparin gtt    Objective:   VS:   Visit Vitals    /69 (BP 1 Location: Right arm, BP Patient Position: At rest)    Pulse 74    Temp 97.1 °F (36.2 °C)    Resp 20    Wt 90.9 kg (200 lb 8 oz)    SpO2 97%    BMI 27.19 kg/m2      Tmax/24hrs: Temp (24hrs), Av.8 °F (36.6 °C), Min:97.1 °F (36.2 °C), Max:98.3 °F (36.8 °C)    Intake/Output Summary (Last 24 hours) at 17 1023  Last data filed at 17 0600   Gross per 24 hour   Intake              360 ml   Output             1025 ml   Net             -665 ml       General:  In NAD. Cardiovascular: RRR. Pulmonary:  Clear, no wheezes. GI:  Abdomen soft, NTTP. Extremities:  Warm, no edema or ischemia.   Additional:  Awake and alert, motor nonfocal.    Labs:    Recent Results (from the past 24 hour(s))   GLUCOSE, POC    Collection Time: 17 11:38 PM   Result Value Ref Range    Glucose (POC) 143 (H) 70 - 369 mg/dL   METABOLIC PANEL, COMPREHENSIVE    Collection Time: 17  3:45 AM   Result Value Ref Range    Sodium 139 136 - 145 mmol/L    Potassium 4.1 3.5 - 5.5 mmol/L    Chloride 108 100 - 108 mmol/L    CO2 24 21 - 32 mmol/L    Anion gap 7 3.0 - 18 mmol/L    Glucose 176 (H) 74 - 99 mg/dL    BUN 19 (H) 7.0 - 18 MG/DL    Creatinine 1.28 0.6 - 1.3 MG/DL    BUN/Creatinine ratio 15 12 - 20      GFR est AA >60 >60 ml/min/1.73m2    GFR est non-AA 57 (L) >60 ml/min/1.73m2    Calcium 9.6 8.5 - 10.1 MG/DL    Bilirubin, total 0.8 0.2 - 1.0 MG/DL    ALT (SGPT) 27 16 - 61 U/L    AST (SGOT) 16 15 - 37 U/L    Alk. phosphatase 52 45 - 117 U/L    Protein, total 6.2 (L) 6.4 - 8.2 g/dL    Albumin 3.3 (L) 3.4 - 5.0 g/dL    Globulin 2.9 2.0 - 4.0 g/dL    A-G Ratio 1.1 0.8 - 1.7     CBC WITH AUTOMATED DIFF    Collection Time: 08/30/17  3:45 AM   Result Value Ref Range    WBC 4.5 (L) 4.6 - 13.2 K/uL    RBC 3.38 (L) 4.70 - 5.50 M/uL    HGB 9.1 (L) 13.0 - 16.0 g/dL    HCT 27.7 (L) 36.0 - 48.0 %    MCV 82.0 74.0 - 97.0 FL    MCH 26.9 24.0 - 34.0 PG    MCHC 32.9 31.0 - 37.0 g/dL    RDW 13.7 11.6 - 14.5 %    PLATELET 075 492 - 239 K/uL    MPV 9.4 9.2 - 11.8 FL    NEUTROPHILS 66 40 - 73 %    LYMPHOCYTES 20 (L) 21 - 52 %    MONOCYTES 10 3 - 10 %    EOSINOPHILS 3 0 - 5 %    BASOPHILS 1 0 - 2 %    ABS. NEUTROPHILS 3.0 1.8 - 8.0 K/UL    ABS. LYMPHOCYTES 0.9 0.9 - 3.6 K/UL    ABS. MONOCYTES 0.4 0.05 - 1.2 K/UL    ABS. EOSINOPHILS 0.1 0.0 - 0.4 K/UL    ABS.  BASOPHILS 0.0 0.0 - 0.1 K/UL    DF AUTOMATED     GLUCOSE, POC    Collection Time: 08/30/17  7:12 AM   Result Value Ref Range    Glucose (POC) 134 (H) 70 - 110 mg/dL       Signed By: Kristin Romo MD     August 30, 2017 10:23 AM

## 2017-08-30 NOTE — PROGRESS NOTES
Problem: Falls - Risk of  Goal: *Absence of Falls  Document Celina Fall Risk and appropriate interventions in the flowsheet.    Outcome: Progressing Towards Goal  Fall Risk Interventions:

## 2017-08-30 NOTE — ROUTINE PROCESS
Bedside shift report given to 1020 \[facility]\"" oncoming nurse by Tunde Wood RN off going nurse including Martin Sylvester, and SERJIO BLANCO JR. [facility]

## 2017-08-30 NOTE — ROUTINE PROCESS
IDR/SLIDR Summary          Patient: Trae Torres MRN: [id number]    Age: 58 y.o. YOB: 1955 Room/Bed: [facility]   Admit Diagnosis:  bleed  Anemia  Acute blood loss anemia  Gross hematuria  Acute blood loss anemia  Gross hematuria  Principal Diagnosis: <principal problem not specified>   Goals: decrease hematuria   Readmission: YES  Quality Measure: Not applicable  VTE Prophylaxis: Not needed  Influenza Vaccine screening completed? NO  Pneumococcal Vaccine screening completed? YES  Mobility needs: No   Nutrition plan:No  Consults:Case Management    Financial concerns:No  Escalated to CM? NO  RRAT Score: 10   Interventions:Home Health  Testing due for pt today?  NO  LOS: 1 days Expected length of stay 3 days  Discharge plan: home   PCP: Sher Marques MD  Transportation needs: No    Days before discharge:two or more days before discharge   Discharge disposition: Home    Signed:     Jeff Fung  8/30/2017  3:40 AM

## 2017-08-30 NOTE — PROGRESS NOTES
NUTRITION    Nursing Referral: [facility]   RECOMMENDATIONS / PLAN:     - Continue diabetic diet  - Continue RD inpatient monitoring and evaluation. NUTRITION INTERVENTIONS & DIAGNOSIS:     [x] Meals/Snacks: modified diet-diabetic diet  [] Medical food supplementation     Nutrition Diagnosis: Unintentional weight loss related to poor appetite as evidenced by pt reporting 20 lb weight loss x 3 months. ASSESSMENT:     Pt reports 20 lbs weight loss over the past few months since he had knee surgery with poor appetite and decreased activity. Average po intake adequate to meet patients estimated nutritional needs:   [x] Yes     [] No   [] Unable to determine at this time    Diet: DIET DIABETIC CONSISTENT CARB Regular      Food Allergies: none  Current Appetite:   [x] Good     [] Fair     [] Poor     [] Other:  Appetite/meal intake prior to admission:   [] Good     [x] Fair     [x] Poor     [] Other:  Feeding Limitations:  [] Swallowing difficulty    [] Chewing difficulty    [] Other:  Current Meal Intake: Patient Vitals for the past 100 hrs:   % Diet Eaten   08/30/17 1128 100 %       BM:  8/29 per pt  Skin Integrity: no pressure injury noted   Edema: none noted  Pertinent Medications: Reviewed    Recent Labs      08/30/17   0345   NA  139   K  4.1   CL  108   CO2  24   GLU  176*   BUN  19*   CREA  1.28   CA  9.6   ALB  3.3*   SGOT  16   ALT  27       Intake/Output Summary (Last 24 hours) at 08/30/17 1333  Last data filed at 08/30/17 1128   Gross per 24 hour   Intake              720 ml   Output             1775 ml   Net            -1055 ml       Anthropometrics:  Ht Readings from Last 1 Encounters:   08/07/17 6' (1.829 m)     Last 3 Recorded Weights in this Encounter    08/30/17 0400   Weight: 90.9 kg (200 lb 8 oz)     Body mass index is 27.19 kg/(m^2). Weight History: Pt reports  lbs with 20 lb (9%) weight loss x 3 months.      Weight Metrics 8/30/2017 8/7/2017 7/20/2017 7/19/2017 7/18/2017   Weight 200 lb 8 oz 207 lb 207 lb 14.4 oz - 205 lb   BMI 27.19 kg/m2 28.07 kg/m2 - 28.2 kg/m2 27.8 kg/m2        Admitting Diagnosis:  bleed  Anemia  Acute blood loss anemia  Gross hematuria  Acute blood loss anemia  Gross hematuria  DX  Pertinent PMHx: DM, HTN    Education Needs:        [x] None identified  [] Identified - Not appropriate at this time  []  Identified and addressed - refer to education log  Learning Limitations:   [x] None identified  [] Identified    Cultural, Latter day & ethnic food preferences:  [x] None identified    [] Identified and addressed     ESTIMATED NUTRITION NEEDS:     Calories: 2296-3875 kcal (MSJx1.2-1.3) based on  [x] Actual BW 91 kg     [] IBW   Protein: 73-91 gm (0.8-1 gm/kg) based on  [x] Actual BW      [] IBW   Fluid: 1 mL/kcal     MONITORING & EVALUATION:     Nutrition Goal(s):   1. Po intake of meals will meet >75% of patient estimated nutritional needs within the next 7 days.   Outcome:  [] Met/Ongoing    []  Not Met    [x] New/Initial Goal     Monitoring:   [x] Diet tolerance   [x] Meal intake   [] Supplement intake   [] GI symptoms/ability to tolerate po diet   [] Respiratory status   [] Plan of care      Previous Recommendations (for follow-up assessments only):     []   Implemented       []   Not Implemented (RD to address)     [] No Recommendation Made     Discharge Planning: diabetic diet   [x] Participated in care planning, discharge planning, & interdisciplinary rounds as appropriate      Long Metzger, [address] [place] [address]   Pager: [phone]

## 2017-08-30 NOTE — ROUTINE PROCESS
Gross blood noted to urine x2. Culture cup given to patient with instructions for use Patient voiced understanding.   Requested patient call Nurse when he voids so bladder scan can be completed as per md order

## 2017-08-31 ENCOUNTER — APPOINTMENT (OUTPATIENT)
Dept: GENERAL RADIOLOGY | Age: 62
DRG: 660 | End: 2017-08-31
Attending: INTERNAL MEDICINE
Payer: COMMERCIAL

## 2017-08-31 ENCOUNTER — ANESTHESIA (OUTPATIENT)
Dept: SURGERY | Age: 62
DRG: 660 | End: 2017-08-31
Payer: COMMERCIAL

## 2017-08-31 LAB
GLUCOSE BLD STRIP.AUTO-MCNC: 146 MG/DL (ref 70–110)
GLUCOSE BLD STRIP.AUTO-MCNC: 153 MG/DL (ref 70–110)
GLUCOSE BLD STRIP.AUTO-MCNC: 156 MG/DL (ref 70–110)
GLUCOSE BLD STRIP.AUTO-MCNC: 167 MG/DL (ref 70–110)
GLUCOSE BLD STRIP.AUTO-MCNC: 169 MG/DL (ref 70–110)

## 2017-08-31 PROCEDURE — 77030012961 HC IRR KT CYSTO/TUR ICUM -A: Performed by: UROLOGY

## 2017-08-31 PROCEDURE — BT1D1ZZ FLUOROSCOPY OF RIGHT KIDNEY, URETER AND BLADDER USING LOW OSMOLAR CONTRAST: ICD-10-PCS | Performed by: UROLOGY

## 2017-08-31 PROCEDURE — 74011636320 HC RX REV CODE- 636/320: Performed by: UROLOGY

## 2017-08-31 PROCEDURE — 0TP98DZ REMOVAL OF INTRALUMINAL DEVICE FROM URETER, VIA NATURAL OR ARTIFICIAL OPENING ENDOSCOPIC: ICD-10-PCS | Performed by: UROLOGY

## 2017-08-31 PROCEDURE — 0TC68ZZ EXTIRPATION OF MATTER FROM RIGHT URETER, VIA NATURAL OR ARTIFICIAL OPENING ENDOSCOPIC: ICD-10-PCS | Performed by: UROLOGY

## 2017-08-31 PROCEDURE — 0TC08ZZ EXTIRPATION OF MATTER FROM RIGHT KIDNEY, VIA NATURAL OR ARTIFICIAL OPENING ENDOSCOPIC: ICD-10-PCS | Performed by: UROLOGY

## 2017-08-31 PROCEDURE — 65270000029 HC RM PRIVATE

## 2017-08-31 PROCEDURE — 77030018836 HC SOL IRR NACL ICUM -A: Performed by: UROLOGY

## 2017-08-31 PROCEDURE — 77030010509 HC AIRWY LMA MSK TELE -A: Performed by: ANESTHESIOLOGY

## 2017-08-31 PROCEDURE — 74011250636 HC RX REV CODE- 250/636

## 2017-08-31 PROCEDURE — 82962 GLUCOSE BLOOD TEST: CPT

## 2017-08-31 PROCEDURE — 76210000006 HC OR PH I REC 0.5 TO 1 HR: Performed by: UROLOGY

## 2017-08-31 PROCEDURE — 76010000161 HC OR TIME 1 TO 1.5 HR INTENSV-TIER 1: Performed by: UROLOGY

## 2017-08-31 PROCEDURE — 74011000250 HC RX REV CODE- 250: Performed by: INTERNAL MEDICINE

## 2017-08-31 PROCEDURE — 77030020269 HC MISC IMPL: Performed by: UROLOGY

## 2017-08-31 PROCEDURE — C1758 CATHETER, URETERAL: HCPCS | Performed by: UROLOGY

## 2017-08-31 PROCEDURE — 82360 CALCULUS ASSAY QUANT: CPT | Performed by: INTERNAL MEDICINE

## 2017-08-31 PROCEDURE — 74011000250 HC RX REV CODE- 250

## 2017-08-31 PROCEDURE — 74011636637 HC RX REV CODE- 636/637: Performed by: ANESTHESIOLOGY

## 2017-08-31 PROCEDURE — 74011636637 HC RX REV CODE- 636/637: Performed by: INTERNAL MEDICINE

## 2017-08-31 PROCEDURE — 0T768DZ DILATION OF RIGHT URETER WITH INTRALUMINAL DEVICE, VIA NATURAL OR ARTIFICIAL OPENING ENDOSCOPIC: ICD-10-PCS | Performed by: UROLOGY

## 2017-08-31 PROCEDURE — 77030010545: Performed by: UROLOGY

## 2017-08-31 PROCEDURE — 77030020782 HC GWN BAIR PAWS FLX 3M -B: Performed by: UROLOGY

## 2017-08-31 PROCEDURE — 77030006974 HC BSKT URET RTVR BSC -C: Performed by: UROLOGY

## 2017-08-31 PROCEDURE — C1894 INTRO/SHEATH, NON-LASER: HCPCS | Performed by: UROLOGY

## 2017-08-31 PROCEDURE — 74420 UROGRAPHY RTRGR +-KUB: CPT

## 2017-08-31 PROCEDURE — 76060000033 HC ANESTHESIA 1 TO 1.5 HR: Performed by: UROLOGY

## 2017-08-31 PROCEDURE — 74011250637 HC RX REV CODE- 250/637: Performed by: NURSE ANESTHETIST, CERTIFIED REGISTERED

## 2017-08-31 PROCEDURE — C1769 GUIDE WIRE: HCPCS | Performed by: UROLOGY

## 2017-08-31 RX ORDER — MAGNESIUM SULFATE 100 %
4 CRYSTALS MISCELLANEOUS AS NEEDED
Status: DISCONTINUED | OUTPATIENT
Start: 2017-08-31 | End: 2017-08-31 | Stop reason: HOSPADM

## 2017-08-31 RX ORDER — HYDROMORPHONE HYDROCHLORIDE 2 MG/ML
0.5 INJECTION, SOLUTION INTRAMUSCULAR; INTRAVENOUS; SUBCUTANEOUS
Status: DISCONTINUED | OUTPATIENT
Start: 2017-08-31 | End: 2017-08-31 | Stop reason: HOSPADM

## 2017-08-31 RX ORDER — MIDAZOLAM HYDROCHLORIDE 1 MG/ML
INJECTION, SOLUTION INTRAMUSCULAR; INTRAVENOUS AS NEEDED
Status: DISCONTINUED | OUTPATIENT
Start: 2017-08-31 | End: 2017-08-31 | Stop reason: HOSPADM

## 2017-08-31 RX ORDER — MORPHINE SULFATE 10 MG/ML
INJECTION, SOLUTION INTRAMUSCULAR; INTRAVENOUS AS NEEDED
Status: DISCONTINUED | OUTPATIENT
Start: 2017-08-31 | End: 2017-08-31 | Stop reason: HOSPADM

## 2017-08-31 RX ORDER — SODIUM CHLORIDE 0.9 % (FLUSH) 0.9 %
5-10 SYRINGE (ML) INJECTION AS NEEDED
Status: DISCONTINUED | OUTPATIENT
Start: 2017-08-31 | End: 2017-08-31 | Stop reason: HOSPADM

## 2017-08-31 RX ORDER — ONDANSETRON 2 MG/ML
INJECTION INTRAMUSCULAR; INTRAVENOUS AS NEEDED
Status: DISCONTINUED | OUTPATIENT
Start: 2017-08-31 | End: 2017-08-31 | Stop reason: HOSPADM

## 2017-08-31 RX ORDER — DEXTROSE 50 % IN WATER (D50W) INTRAVENOUS SYRINGE
25-50 AS NEEDED
Status: DISCONTINUED | OUTPATIENT
Start: 2017-08-31 | End: 2017-08-31 | Stop reason: HOSPADM

## 2017-08-31 RX ORDER — SODIUM CHLORIDE, SODIUM LACTATE, POTASSIUM CHLORIDE, CALCIUM CHLORIDE 600; 310; 30; 20 MG/100ML; MG/100ML; MG/100ML; MG/100ML
100 INJECTION, SOLUTION INTRAVENOUS CONTINUOUS
Status: DISCONTINUED | OUTPATIENT
Start: 2017-08-31 | End: 2017-08-31 | Stop reason: HOSPADM

## 2017-08-31 RX ORDER — FAMOTIDINE 20 MG/1
20 TABLET, FILM COATED ORAL ONCE
Status: COMPLETED | OUTPATIENT
Start: 2017-08-31 | End: 2017-08-31

## 2017-08-31 RX ORDER — INSULIN LISPRO 100 [IU]/ML
INJECTION, SOLUTION INTRAVENOUS; SUBCUTANEOUS ONCE
Status: COMPLETED | OUTPATIENT
Start: 2017-08-31 | End: 2017-08-31

## 2017-08-31 RX ORDER — INSULIN LISPRO 100 [IU]/ML
INJECTION, SOLUTION INTRAVENOUS; SUBCUTANEOUS ONCE
Status: DISCONTINUED | OUTPATIENT
Start: 2017-08-31 | End: 2017-08-31 | Stop reason: HOSPADM

## 2017-08-31 RX ORDER — CEFAZOLIN SODIUM 1 G/3ML
INJECTION, POWDER, FOR SOLUTION INTRAMUSCULAR; INTRAVENOUS AS NEEDED
Status: DISCONTINUED | OUTPATIENT
Start: 2017-08-31 | End: 2017-08-31 | Stop reason: HOSPADM

## 2017-08-31 RX ORDER — PROCHLORPERAZINE EDISYLATE 5 MG/ML
5 INJECTION INTRAMUSCULAR; INTRAVENOUS ONCE
Status: DISCONTINUED | OUTPATIENT
Start: 2017-08-31 | End: 2017-08-31 | Stop reason: HOSPADM

## 2017-08-31 RX ORDER — SODIUM CHLORIDE, SODIUM LACTATE, POTASSIUM CHLORIDE, CALCIUM CHLORIDE 600; 310; 30; 20 MG/100ML; MG/100ML; MG/100ML; MG/100ML
INJECTION, SOLUTION INTRAVENOUS
Status: DISCONTINUED | OUTPATIENT
Start: 2017-08-31 | End: 2017-08-31 | Stop reason: HOSPADM

## 2017-08-31 RX ORDER — SODIUM CHLORIDE 0.9 % (FLUSH) 0.9 %
5-10 SYRINGE (ML) INJECTION EVERY 8 HOURS
Status: DISCONTINUED | OUTPATIENT
Start: 2017-08-31 | End: 2017-08-31 | Stop reason: HOSPADM

## 2017-08-31 RX ORDER — LIDOCAINE HYDROCHLORIDE 20 MG/ML
INJECTION, SOLUTION EPIDURAL; INFILTRATION; INTRACAUDAL; PERINEURAL AS NEEDED
Status: DISCONTINUED | OUTPATIENT
Start: 2017-08-31 | End: 2017-08-31 | Stop reason: HOSPADM

## 2017-08-31 RX ORDER — PROPOFOL 10 MG/ML
INJECTION, EMULSION INTRAVENOUS AS NEEDED
Status: DISCONTINUED | OUTPATIENT
Start: 2017-08-31 | End: 2017-08-31 | Stop reason: HOSPADM

## 2017-08-31 RX ADMIN — ONDANSETRON 4 MG: 2 INJECTION INTRAMUSCULAR; INTRAVENOUS at 08:43

## 2017-08-31 RX ADMIN — LIDOCAINE HYDROCHLORIDE 100 MG: 20 INJECTION, SOLUTION EPIDURAL; INFILTRATION; INTRACAUDAL; PERINEURAL at 07:35

## 2017-08-31 RX ADMIN — INSULIN LISPRO 3 UNITS: 100 INJECTION, SOLUTION INTRAVENOUS; SUBCUTANEOUS at 09:20

## 2017-08-31 RX ADMIN — Medication 10 ML: at 05:49

## 2017-08-31 RX ADMIN — CEFAZOLIN SODIUM 2 G: 1 INJECTION, POWDER, FOR SOLUTION INTRAMUSCULAR; INTRAVENOUS at 07:41

## 2017-08-31 RX ADMIN — SODIUM CHLORIDE, SODIUM LACTATE, POTASSIUM CHLORIDE, CALCIUM CHLORIDE: 600; 310; 30; 20 INJECTION, SOLUTION INTRAVENOUS at 07:30

## 2017-08-31 RX ADMIN — INSULIN LISPRO 3 UNITS: 100 INJECTION, SOLUTION INTRAVENOUS; SUBCUTANEOUS at 11:41

## 2017-08-31 RX ADMIN — MORPHINE SULFATE 10 MG: 10 INJECTION, SOLUTION INTRAMUSCULAR; INTRAVENOUS at 07:30

## 2017-08-31 RX ADMIN — FAMOTIDINE 20 MG: 20 TABLET ORAL at 06:23

## 2017-08-31 RX ADMIN — PROPOFOL 150 MG: 10 INJECTION, EMULSION INTRAVENOUS at 07:35

## 2017-08-31 RX ADMIN — FAMOTIDINE 20 MG: 10 INJECTION INTRAVENOUS at 21:11

## 2017-08-31 RX ADMIN — INSULIN LISPRO 2 UNITS: 100 INJECTION, SOLUTION INTRAVENOUS; SUBCUTANEOUS at 21:11

## 2017-08-31 RX ADMIN — MIDAZOLAM HYDROCHLORIDE 2 MG: 1 INJECTION, SOLUTION INTRAMUSCULAR; INTRAVENOUS at 07:30

## 2017-08-31 RX ADMIN — INSULIN LISPRO 2 UNITS: 100 INJECTION, SOLUTION INTRAVENOUS; SUBCUTANEOUS at 18:03

## 2017-08-31 NOTE — H&P (VIEW-ONLY)
No diagnosis found. ASSESSMENT:   5.6mm right UPJ stone - S/p right stent placement on 7/21/2017                        CT scan 6/28/2017: Right: 5.6 mm at renal pelvis, as well as 4 additional stones; Left: 4mm stone in the upper pole                                       62yoM hx nephrolithiasis s/p Right ureteral stent 7/21/17 present with gross hematuria and anemia transferred from [facility]    Hx of DVT on Xarelto      PLAN:      CT reviewed  Stent in good position   No RP kidney hemorrhage  U Cx  Trend Hbg  Check PVR  Would assess for other sources of anemia. With minimal clots red urine is expected with stent in place on Xarelto   Will follow      Negro Kelley MD  [facility]  [address] [facility], [address] [place] [address]      Cc: Gross hematuria    HISTORY OF PRESENT ILLNESS:  Jeremy Marvin is a 58 y.o. male who is seen in consultation as referred by Dr Mateusz Oconnor for Gross hematuria     Jeremy aMrvin is a 58 y.o.  male who presented to PCP ofc today b/c he'd been feeling fatigued. H/H reported as abnormally low and Pt was transferred to [facility] ER for PRBC transfusion. In May 2017, Pt presented for elective L quadricept tendon repair. In July 2017, Pt c/o abd pain and was dx with nephrolithiasis with obstructive uropathy and underwent R ureteral stent placement 7/21. During the same hospitalization Pt was dx with acute DVT in the LLE. He was started on Xarelto and reports that he's had daily hematuria since July 27th. While in the ER, VSS. Pt was transfused 2 Units PRBC and transferred to [facility] for further mgmt. No fever, no chills. AUA Symptom Score 8/7/2017   Over the past month how often have you had the sensation that your bladder was not completely empty after you finished urinating?  3   Over the past month, how often have had to urinate again less than 2 hours after you last finished urinating? 3   Over the past month, how often have you found you stopped and started again several times when you urinated? 1   Over the past month, how often have you found it difficult to postpone urination? 5   Over the past month, how often have you had a weak urinary stream? 1   Over the past month, how often have you had to push or strain to begin urinating? 0   Over the past month, how many times did you most typically get up to urinate from the time you went to bed at night until the time you got up in the morning? 2   AUA Score 15   If you were to spend the rest of your life with your urinary condition the way it is now, how would you feel about that?  Mostly dissatisfied         Past Medical History:   Diagnosis Date    Diabetes ([address]     Gall stones     Hematuria     Hypertension     Kidney stone        Past Surgical History:   Procedure Laterality Date    HX COLONOSCOPY      HX OTHER SURGICAL      knee surgery       Social History   Substance Use Topics    Smoking status: Never Smoker    Smokeless tobacco: Never Used    Alcohol use No       No Known Allergies    Family History   Problem Relation Age of Onset    Colon Cancer Mother        Current Facility-Administered Medications   Medication Dose Route Frequency Provider Last Rate Last Dose    morphine injection 2 mg  2 mg IntraVENous Q4H PRN Dejan Chong MD        ondansetron [facility]) injection 4 mg  4 mg IntraVENous Q4H PRN Dejan Chong MD        glucose chewable tablet 16 g  16 g Oral PRN Dejan Chong MD        glucagon (GLUCAGEN) injection 1 mg  1 mg IntraMUSCular PRN Dejan Chong MD        dextrose (D50W) injection syrg 12.5-25 g  25-50 mL IntraVENous PRN Dejan Chong MD        insulin lispro (HUMALOG) injection   SubCUTAneous AC&HS Dejan Chong MD        famotidine (PF) (PEPCID) injection 20 mg  20 mg IntraVENous Q12H Dejan Chong MD   20 mg at 08/30/17 0022       Review of Systems  Constitutional: Fever:  negative  Skin: Rash:  negative  HEENT: Hearing difficulty:  negative  Eyes: Blurred vision:  negative  Cardiovascular: Chest pain:  negative  Respiratory: Shortness of breath:  negative  Gastrointestinal: Nausea/vomiting:  negative  Musculoskeletal: Back pain:  negative  Neurological: Weakness:  negative  Psychological: Memory loss:  negative  Comments/additional findings:  negative          PHYSICAL EXAMINATION:   There were no vitals taken for this visit. Constitutional: Well developed, well nourished male. No acute distress. HEENT: Normocephalic, Atraumatic, EOM's intact   CV:  Radial pulse is intact bilaterally. No peripheral swelling noted   Respiratory: No respiratory distress or difficulties breathing   Abdomen:  No abdominal masses or tenderness. No CVA tenderness. No inguinal hernias noted.  Male:  No CVA tenderness  Skin: No evidence of jaundice. Normal color  Neuro/Psych:  Alert and oriented. Affect appropriate. Lymphatic:   No enlarged inguinal lymph nodes. REVIEW OF LABS AND IMAGING:      Labs: Results:   Chemistry  No results for input(s): GLU, NA, K, CL, CO2, BUN, CREA, CA, AGAP, BUCR, TBIL, GPT, AP, TP, ALB, GLOB, AGRAT in the last 72 hours. CBC w/Diff No results for input(s): WBC, RBC, HGB, HCT, PLT, GRANS, LYMPH, EOS, HGBEXT, HCTEXT, PLTEXT in the last 72 hours. Cultures No results for input(s): CULT in the last 72 hours. All Micro Results     None            Urinalysis Potassium   Date Value Ref Range Status   07/24/2017 4.2 3.5 - 5.5 mmol/L Final     Creatinine   Date Value Ref Range Status   07/24/2017 1.23 0.6 - 1.3 MG/DL Final     BUN   Date Value Ref Range Status   07/24/2017 18 7.0 - 18 MG/DL Final      PSA No results for input(s): PSA in the last 72 hours.    Coagulation Lab Results   Component Value Date/Time    aPTT 32.6 07/23/2017 01:00 PM    aPTT 95.9 07/23/2017 06:45 AM           8/29/17  Hbg 7.6  Cr 1.2    CT 8/29: reviewed - in chart

## 2017-08-31 NOTE — ADT AUTH CERT NOTES
Urologic Disease [facility] - Care Day 2 (8/30/2017) by Manuel Rizvi RN        Review Status Review Entered       Completed 8/30/2017       Details              Care Day: 2 Care Date: 8/30/2017 Level of Care: Telemetry       Guideline Day 2        Clinical Status       (X) * No ICU or intermediate care needs              8/30/2017 2:37 PM EDT by Rosana Aaron       Subject: Additional Clinical Information       vs: 98.3, 79, 115/73, 20, 98%orders: NPO, diabetic diet, urine culture, pulse ox, vs continuous, cardiac monitor, meds: pepcid 20mg iv q 12hrs, labs: hgb 9.1, hct 27.7, glucose 176, bun 19, ASSESSMENT:  1. Acute blood loss anemia  2. Right UPJ 6mm stone s/p Stent 7/21/17  3. Hematuria  4. LLE DVT on Xarelto              Plan:     1. Reponded appropriately to 2u prbc yesterday  2. Hold Xarelto  3. OR tomorrow for Rt URS, laser litho, stent exchange  4. NPO after midnight  5. Consider US LLE duplex to eval DVT  6. Periop Abx (Ancef)  7.  Urine culture

## 2017-08-31 NOTE — ROUTINE PROCESS
Pt up with assist to bathroom for voiding. Urine is light pink in color, clearer with each voiding episode. Patient denies pain, states some slight burning with urination. No additional needs noted at this time. Call light within reach, family at the bedside.

## 2017-08-31 NOTE — INTERVAL H&P NOTE
H&P Update:  Mary Moreno was seen and examined. History and physical has been reviewed. The patient has been examined. There have been no significant clinical changes since the completion of the originally dated History and Physical.  OR today for Rt URS, laser litho, stent exchange. Holding Xarelto.     Signed By: Ty Gaitan MD     August 31, 2017 7:23 AM

## 2017-08-31 NOTE — ROUTINE PROCESS
IDR/SLIDR Summary          Patient: Amanda Garcia MRN: [id number]    Age: 58 y.o. YOB: 1955 Room/Bed: [facility]/   Admit Diagnosis:  bleed  Anemia  Acute blood loss anemia  Gross hematuria  Acute blood loss anemia  Gross hematuria  DX  Principal Diagnosis: <principal problem not specified>   Goals: no bleeding, safety  Readmission: NO  Quality Measure: Not applicable  VTE Prophylaxis: Chemical/xarelto  Influenza Vaccine screening completed? NO  Pneumococcal Vaccine screening completed? NO  Mobility needs: No   Nutrition plan:No  Consults:Case Management    Financial concerns:No  Escalated to CM? NO  RRAT Score: 10   Interventions:H2H  Testing due for pt today?  YES  LOS: 2 days Expected length of stay pending days  Discharge plan: home   PCP: Chrissie Cranker, MD  Transportation needs: No    Days before discharge:discharge pending  Discharge disposition: Home  For cystoscopy 8/31/17    Signed:     Noé Abrams  8/31/2017  12:52 AM

## 2017-08-31 NOTE — PROGRESS NOTES
conducted a pre-op visit with Jose Samayoa, who is a 58 y. o.,male. The  provided the following Interventions:  Initiated a relationship of care and support. Plan:  Chaplains will continue to follow and will provide pastoral care on an as needed/requested basis.  recommends bedside caregivers page  on duty if patient shows signs of acute spiritual or emotional distress.     [address] Certified [address] [place] [address]   [phone]

## 2017-08-31 NOTE — ROUTINE PROCESS
Bedside and Verbal shift change report given to Pepe Hahn RN (oncoming nurse) by Patrick Del Angel RN (offgoing nurse). Report included the following information SBAR, Kardex, Intake/Output, MAR and Cardiac Rhythm NSR.

## 2017-08-31 NOTE — OP NOTES
[facility]ATION [facility] PATIENT:  [facility]    DATE OF OPERATION:  8/31/2017    SURGEON:  Divya Lino MD    PREOPERATIVE DIAGNOSIS:  1. Right UPJ stone 6mm  2. S/p Right ureteral stent 7/21/17  3. Right renal colic  4. Right hydronephrosis    POSTOPERATIVE DIAGNOSIS:  1. Right mid ureteral stone 6mm  2. S/p Right ureteral stent 7/21/17  3. Right renal colic  4. Right hydronephrosis  5. 4mm Rt Midpole stone      OPERATION PERFORMED:  1. Cystoscopy  2. Right retrograde pyelogram  3. Right Ureteral dilation  4. Right ureteroscopy,   5. Laser lithotripsy  6. Basket stone extraction. 7. Right ureteral stent exchange  8. Fluoroscopic interpretation~ 1 hr      COMPLICATIONS: None    ANESTHESIA: General    ANTIBIOTICS: Ancef    EBL: 5 ml    DRAINS:  6x26 J-J stent      FINDINGS: 4mm Right midpole stone and 6mm Mid ureteral stone, stent in place     DISPO: PACU and HOME    DESCRIPTION OF OPERATION:  After consent was obtained he was brought into the operating room, placed in a supine position. Anesthesia was administered. IV Antibiotic was given. He was placed in dorsal lithotomy position, prepped and draped in normal sterile fashion.    A time out was conducted. Using 21 Fr sheath and 30 degrees lens scope, we performed a cystourethroscopy. Normal urethral.     Bilateral ureteral orifices were in normal positions. There was no stone or lesions found in the bladder.  There was a stent seen emanating from the right ureteral orifice. A sensor wire was passed along side the stent and verified to be in the kidney under fluoroscopy. The stent was removed with flexible grasper. We dilated the ureter with 8-10 coaxial dilation and placed a second sensor wire. I performed a rigid ureteroscopy and  film reveal radio opaque stone in mid ureter on right. Stone was  Encountered but it was a difficult angle and visualization was poor due to edema.      We then place a 11-13 ureteral access sheath up to the mid ureter and later upsized to 13-15 access sheath. We identified in the mid ureter and it migrated upstream into the right upper pole. We then used a 200 micron laser fiber via flexible ureteroscope to fragment the stones and all fragments were removed and sent for analysis.     We performed a second look serially looking at all of the calyces and we found another 4mm stone in midpole. This was fragmented with laser fiber and removed. There were no other stones found.  We performed a retrograde pyelogram and there were no filling defects or extravasation.  The flexible ureteroscope was then removed slowly while looking at the ureter on the way out. No stone fragments were encountered  . A 6x26 Fr J -J stent was then back loaded and placed with proximal curl in Renal pelvis and distal curl in bladder verified cystoscopically and flouroscopically. The bladder was emptied and patient was awoken from anesthesia and transported to PACU in stable condition. PLAN: PATIENT CAN BE DISCHARGED HOME LATER TODAY.     FOLLOW UP IN OFFICE 1 WEEK FOR STENT REMOVAL   MAY RESTART XARELTO IN 72HR IF NO BLEEDING.    ______________________________  Fredy Benjamin MD

## 2017-08-31 NOTE — ANESTHESIA PREPROCEDURE EVALUATION
Anesthetic History   No history of anesthetic complications            Review of Systems / Medical History  Patient summary reviewed and pertinent labs reviewed    Pulmonary  Within defined limits                 Neuro/Psych   Within defined limits           Cardiovascular    Hypertension: well controlled          CAD    Exercise tolerance: <4 METS     GI/Hepatic/Renal         Renal disease: stones       Endo/Other    Diabetes: well controlled, type 2         Other Findings   Comments:   Risk Factors for Postoperative nausea/vomiting:       History of postoperative nausea/vomiting? NO       Female? NO       Motion sickness? NO       Intended opioid administration for postoperative analgesia? YES      Smoking Abstinence  Current Smoker? NO  Elective Surgery? YES  Seen preoperatively by anesthesiologist or proxy prior to day of surgery? YES  Pt abstained from smoking 24 hours prior to anesthesia?  N/A         Physical Exam    Airway  Mallampati: II  TM Distance: 4 - 6 cm  Neck ROM: normal range of motion   Mouth opening: Normal     Cardiovascular  Regular rate and rhythm,  S1 and S2 normal,  no murmur, click, rub, or gallop             Dental  No notable dental hx       Pulmonary  Breath sounds clear to auscultation               Abdominal  GI exam deferred       Other Findings            Anesthetic Plan    ASA: 3  Anesthesia type: general          Induction: Intravenous  Anesthetic plan and risks discussed with: Patient

## 2017-08-31 NOTE — ANESTHESIA POSTPROCEDURE EVALUATION
Post-Anesthesia Evaluation & Assessment    Visit Vitals    /67 (BP 1 Location: Right arm, BP Patient Position: At rest)    Pulse 73    Temp 36.3 °C (97.3 °F)    Resp 18    Ht 5' 11\" (1.803 m)    Wt 90.9 kg (200 lb 8 oz)    SpO2 95%    BMI 27.19 kg/m2       Post-operative hydration adequate. Pain score (VAS): 0 Pain Scale 1: Visual (08/31/17 0920)  Pain Intensity 1: 0 (08/31/17 0920)   Managed. Mental status & Level of consciousness: returned to baseline    Neurological status: returned to baseline    Pulmonary status: airway patent, oxygen given as needed. Complications related to anesthesia: none    Patient has met all discharge requirements.     Additional comments:        Sharonda Petersen MD  August 31, 2017

## 2017-08-31 NOTE — ROUTINE PROCESS
Received patient in bed, awake, alert and oriented. No complaints made, will continue to monitor. Report  Given by DEREK Tavarez RN.

## 2017-08-31 NOTE — PROGRESS NOTES
[facility]  Progress Note    Patient: Sugar Orozco Age: 58 y.o. : 1955 MR#: [id number] SSN: [id number]  Date/Time: 2017 1:45 PM    Subjective/24-hour events:     Recently back to floor from procedure. Feels a little tired, but no new complaints otherwise. Wife present at bedside. Assessment:   Hematuria  Acute blood loss anemia s/p transfusion 2 units PRBCs prior to transfer  R UPJ stone s/p recent stent placement - now s/p laser lithotripsy with basket extraction and R ureteral stent exchange  LLE DVT on NOAC therapy (xarelto)  CAD hx    Plan:  PVL reviewed - has some chronic DVT still present in RLE. Continue to hold xarelto, with plan to resume in 72 hours or so. Monitor H/H. Monitor overnight, F/U labs . Home in AM if stable. Case discussed with:  [x]Patient  [x]Family  []Nursing  []Case Management  DVT Prophylaxis:  []Lovenox  []Hep SQ  []SCDs  []Coumadin   []On Heparin gtt    Objective:   VS:   Visit Vitals    /67 (BP 1 Location: Right arm, BP Patient Position: At rest)    Pulse 73    Temp 97.3 °F (36.3 °C)    Resp 18    Ht 5' 11\" (1.803 m)    Wt 90.9 kg (200 lb 8 oz)    SpO2 95%    BMI 27.19 kg/m2      Tmax/24hrs: Temp (24hrs), Av.8 °F (36.6 °C), Min:97.3 °F (36.3 °C), Max:98.6 °F (37 °C)      Intake/Output Summary (Last 24 hours) at 17 1346  Last data filed at 17 1125   Gross per 24 hour   Intake             1440 ml   Output             1960 ml   Net             -520 ml       General:  In NAD. Cardiovascular: RRR. Pulmonary:  Clear, no wheezes. GI:  Abdomen soft, NTTP. Extremities:  Warm, no edema or ischemia.   Additional:  Awake and alert, motor nonfocal.    Labs:    Recent Results (from the past 24 hour(s))   GLUCOSE, POC    Collection Time: 17  9:16 PM   Result Value Ref Range    Glucose (POC) 129 (H) 70 - 110 mg/dL   GLUCOSE, POC    Collection Time: 17  5:40 AM   Result Value Ref Range Glucose (POC) 146 (H) 70 - 110 mg/dL   GLUCOSE, POC    Collection Time: 08/31/17  9:17 AM   Result Value Ref Range    Glucose (POC) 156 (H) 70 - 110 mg/dL   GLUCOSE, POC    Collection Time: 08/31/17 11:35 AM   Result Value Ref Range    Glucose (POC) 153 (H) 70 - 110 mg/dL       Signed By: Kristin Romo MD     August 31, 2017 1:45 PM

## 2017-08-31 NOTE — PROGRESS NOTES
Problem: Falls - Risk of  Goal: *Absence of Falls  Document Celina Fall Risk and appropriate interventions in the flowsheet.    Outcome: Not Progressing Towards Goal  Fall Risk Interventions:              Medication Interventions: Assess postural VS orthostatic hypotension, Bed/chair exit alarm, Evaluate medications/consider consulting pharmacy, Patient to call before getting OOB, Teach patient to arise slowly

## 2017-08-31 NOTE — PERIOP NOTES
TRANSFER - OUT REPORT:    Verbal report given to ARNOLD Wing(name) on [facility]  being transferred to [address] [place] [address](unit) for routine progression of care       Report consisted of patients Situation, Background, Assessment and   Recommendations(SBAR). Information from the following report(s) SBAR, Kardex, OR Summary, Procedure Summary, Intake/Output, MAR, Recent Results and Med Rec Status was reviewed with the receiving nurse. Lines:   Peripheral IV 08/29/17 Left Antecubital (Active)   Site Assessment Clean, dry, & intact 8/31/2017  9:00 AM   Phlebitis Assessment 0 8/31/2017  9:00 AM   Infiltration Assessment 0 8/31/2017  9:00 AM   Dressing Status Clean, dry, & intact 8/31/2017  9:00 AM   Dressing Type Tape;Transparent 8/31/2017  9:00 AM   Hub Color/Line Status Infusing;Blue 8/31/2017  9:00 AM        Opportunity for questions and clarification was provided.       Patient transported with:   [employer]

## 2017-09-01 VITALS
WEIGHT: 200.8 LBS | OXYGEN SATURATION: 96 % | BODY MASS INDEX: 28.11 KG/M2 | TEMPERATURE: 97.9 F | HEIGHT: 71 IN | RESPIRATION RATE: 18 BRPM | HEART RATE: 88 BPM | DIASTOLIC BLOOD PRESSURE: 70 MMHG | SYSTOLIC BLOOD PRESSURE: 130 MMHG

## 2017-09-01 LAB
BASOPHILS # BLD: 0 K/UL (ref 0–0.1)
BASOPHILS NFR BLD: 0 % (ref 0–2)
DIFFERENTIAL METHOD BLD: ABNORMAL
EOSINOPHIL # BLD: 0.1 K/UL (ref 0–0.4)
EOSINOPHIL NFR BLD: 2 % (ref 0–5)
ERYTHROCYTE [DISTWIDTH] IN BLOOD BY AUTOMATED COUNT: 14.4 % (ref 11.6–14.5)
GLUCOSE BLD STRIP.AUTO-MCNC: 152 MG/DL (ref 70–110)
HCT VFR BLD AUTO: 28.3 % (ref 36–48)
HGB BLD-MCNC: 9 G/DL (ref 13–16)
LYMPHOCYTES # BLD: 0.7 K/UL (ref 0.9–3.6)
LYMPHOCYTES NFR BLD: 13 % (ref 21–52)
MCH RBC QN AUTO: 26.5 PG (ref 24–34)
MCHC RBC AUTO-ENTMCNC: 31.8 G/DL (ref 31–37)
MCV RBC AUTO: 83.2 FL (ref 74–97)
MONOCYTES # BLD: 0.6 K/UL (ref 0.05–1.2)
MONOCYTES NFR BLD: 11 % (ref 3–10)
NEUTS SEG # BLD: 4.3 K/UL (ref 1.8–8)
NEUTS SEG NFR BLD: 74 % (ref 40–73)
PLATELET # BLD AUTO: 300 K/UL (ref 135–420)
PMV BLD AUTO: 9.2 FL (ref 9.2–11.8)
RBC # BLD AUTO: 3.4 M/UL (ref 4.7–5.5)
WBC # BLD AUTO: 5.8 K/UL (ref 4.6–13.2)

## 2017-09-01 PROCEDURE — 36415 COLL VENOUS BLD VENIPUNCTURE: CPT | Performed by: UROLOGY

## 2017-09-01 PROCEDURE — 74011000250 HC RX REV CODE- 250: Performed by: INTERNAL MEDICINE

## 2017-09-01 PROCEDURE — 82962 GLUCOSE BLOOD TEST: CPT

## 2017-09-01 PROCEDURE — 85025 COMPLETE CBC W/AUTO DIFF WBC: CPT | Performed by: UROLOGY

## 2017-09-01 PROCEDURE — 74011636637 HC RX REV CODE- 636/637: Performed by: INTERNAL MEDICINE

## 2017-09-01 PROCEDURE — 74011250637 HC RX REV CODE- 250/637: Performed by: UROLOGY

## 2017-09-01 RX ORDER — OXYCODONE AND ACETAMINOPHEN 5; 325 MG/1; MG/1
1 TABLET ORAL
Qty: 20 TAB | Refills: 0 | Status: SHIPPED | OUTPATIENT
Start: 2017-09-01 | End: 2017-12-11

## 2017-09-01 RX ADMIN — FAMOTIDINE 20 MG: 10 INJECTION INTRAVENOUS at 09:14

## 2017-09-01 RX ADMIN — DOCUSATE SODIUM 100 MG: 100 CAPSULE, LIQUID FILLED ORAL at 09:14

## 2017-09-01 RX ADMIN — INSULIN LISPRO 2 UNITS: 100 INJECTION, SOLUTION INTRAVENOUS; SUBCUTANEOUS at 08:59

## 2017-09-01 NOTE — ROUTINE PROCESS
Bedside and Verbal shift change report given to Dang Santana RN (oncoming nurse) by Jolly Avery (offgoing nurse). Report included the following information SBAR, Kardex, MAR and Recent Results.     SITUATION:    Code Status: Full Code   Reason for Admission:  bleed   Anemia   Acute blood loss anemia   Gross hematuria   Acute blood loss anemia   Gross hematuria   DX    [facility] day: 3   Problem List:       Hospital Problems  Date Reviewed: 8/7/2017          Codes Class Noted POA    Acute blood loss anemia ICD-10-CM: D62  ICD-9-CM: 285.1  8/29/2017 Yes        Gross hematuria ICD-10-CM: R31.0  ICD-9-CM: 599.71  8/29/2017 Yes        CAD (coronary artery disease) ICD-10-CM: I25.10  ICD-9-CM: 414.00  8/29/2017 Yes        Kidney stones ICD-10-CM: N20.0  ICD-9-CM: 592.0  7/19/2017 Yes        Diabetes mellitus type 2, controlled ([facility] ICD-10-CM: E11.9  ICD-9-CM: 250.00  7/19/2017 Yes              BACKGROUND:    Past Medical History:   Past Medical History:   Diagnosis Date    Diabetes ([facility]     Gall stones     Hematuria     Hypertension     Kidney stone          Patient taking anticoagulants no     ASSESSMENT:    Changes in Assessment Throughout Shift: none     Patient has Central Line: no Reasons if yes: na   Patient has Mcbride Cath: no Reasons if yes: na      Last Vitals:     Vitals:    08/31/17 1125 08/31/17 2010 09/01/17 0043 09/01/17 0439   BP: 111/67 106/65 128/70 116/70   Pulse: 73 79 90 87   Resp: 18 18 18 18   Temp: 97.3 °F (36.3 °C) 98.2 °F (36.8 °C) 97.7 °F (36.5 °C) 98 °F (36.7 °C)   SpO2: 95% 96% 96% 96%   Weight:    91.1 kg (200 lb 12.8 oz)   Height:            IV and DRAINS (will only show if present)   Peripheral IV 08/29/17 Left Antecubital-Site Assessment: Clean, dry, & intact  Nephroureteral Drain 08/31/17 Right Ureter-Site Assessment: Clean, dry, & intact     WOUND (if present)   Wound Type:  none   Dressing present Dressing Present : No   Wound Concerns/Notes: none     PAIN    Pain Assessment    Pain Intensity 1: 0 (09/01/17 0415)              Patient Stated Pain Goal: 0  o Interventions for Pain:  none  o Intervention effective: na  o Time of last intervention: na   o Reassessment Completed: yes      Last 3 Weights:  Last 3 Recorded Weights in this Encounter    08/30/17 0400 09/01/17 0439   Weight: 90.9 kg (200 lb 8 oz) 91.1 kg (200 lb 12.8 oz)     Weight change:      INTAKE/OUPUT    Current Shift: 08/31 1901 - 09/01 0700  In: 200 [P.O.:200]  Out: 550 [Urine:550]    Last three shifts: 08/30 0701 - 08/31 1900  In: 1800 [P.O.:600; I.V.:1200]  Out: 3110 [Urine:3110]     LAB RESULTS     Recent Labs      08/30/17   0345   WBC  4.5*   HGB  9.1*   HCT  27.7*   PLT  320        Recent Labs      08/30/17   0345   NA  139   K  4.1   GLU  176*   BUN  19*   CREA  1.28   CA  9.6       RECOMMENDATIONS AND DISCHARGE PLANNING     1. Pending tests/procedures/ Plan of Care or Other Needs: for discharge     2. Discharge plan for patient and Needs/Barriers: home    3. Estimated Discharge Date: 9/1/17 Posted on Whiteboard in [address] [place] [address] Room: yes      4. The patient's care plan was reviewed with the oncoming nurse. \"HEALS\" SAFETY CHECK      Fall Risk    Total Score: 1    Safety Measures: Safety Measures: Bed/Chair-Wheels locked, Bed in low position, Call light within reach, Fall prevention (comment), Gripper socks, Side rails X2    A safety check occurred in the patient's room between off going nurse and oncoming nurse listed above.     The safety check included the below items  Area Items   H  High Alert Medications - Verify all high alert medication drips (heparin, PCA, etc.)   E  Equipment - Suction is set up for ALL patients (with yanker)  - Red plugs utilized for all equipment (IV pumps, etc.)  - WOWs wiped down at end of shift.  - Room stocked with oxygen, suction, and other unit-specific supplies   A  Alarms - Bed alarm is set for fall risk patients  - Ensure chair alarm is in place and activated if patient is up in a chair   L  Lines - Check IV for any infiltration  - Mcbride bag is empty if patient has a Mcbried   - Tubing and IV bags are labeled   S  Safety   - Room is clean, patient is clean, and equipment is clean. - Hallways are clear from equipment besides carts. - Fall bracelet on for fall risk patients  - Ensure room is clear and free of clutter  - Suction is set up for ALL patients (with yanker)  - Hallways are clear from equipment besides carts.    - Isolation precautions followed, supplies available outside room, sign posted     Evelyn Collins

## 2017-09-01 NOTE — PROGRESS NOTES
Care Management Interventions  PCP Verified by CM: Yes (DR. JACKIE SEVILLA)  Palliative Care Consult (Criteria: CHF and RRAT>21): No  Reason for No Palliative Care Consult: Other (see comment) (NO ORDER)  Mode of Transport at Discharge:  Other (see comment) (FAMILY WILL TRANSPORT)  Transition of Care Consult (CM Consult): Discharge Planning, Home Health ([address])  [facility] - INPATIENT: Yes  Current Support Network: Lives with Spouse, Family Lives [place], Own Home  Confirm Follow Up Transport: Family  Plan discussed with Pt/Family/Caregiver: Yes (PT 10 [address] [facility])  Discharge Location  Discharge Placement: Home with home health

## 2017-09-01 NOTE — DISCHARGE INSTRUCTIONS
Angina: Care Instructions  Your Care Instructions    You have a problem called angina. Angina happens when there is not enough blood flow to your heart muscle. Angina is a sign of coronary artery disease (CAD). CAD occurs when blood vessels that supply the heart become narrowed. Having CAD increases your risk of a heart attack. Chest pain or pressure is the most common symptom of angina. But some people have other symptoms, like:  · Pain, pressure, or a strange feeling in the back, neck, jaw, or upper belly, or in one or both shoulders or arms. · Shortness of breath. · Nausea or vomiting. · Lightheadedness or sudden weakness. · Fast or irregular heartbeat. Women are somewhat more likely than men to have angina symptoms like shortness of breath, nausea, and back or jaw pain. Angina can be dangerous. That's why it is important to pay attention to your symptoms. Know what is typical for you, learn how to control your symptoms, and understand when you need to get treatment. A change in your usual pattern of symptoms is an emergency. It may mean that you are having a heart attack. The doctor has checked you carefully, but problems can develop later. If you notice any problems or new symptoms, get medical treatment right away. Follow-up care is a key part of your treatment and safety. Be sure to make and go to all appointments, and call your doctor if you are having problems. It's also a good idea to know your test results and keep a list of the medicines you take. How can you care for yourself at home? Medicines  · If your doctor has given you nitroglycerin for angina symptoms, keep it with you at all times. If you have symptoms, sit down and rest, and take the first dose of nitroglycerin as directed. If your symptoms get worse or are not getting better within 5 minutes, call 911 right away. Stay on the phone. The emergency  will give you further instructions.   · If your doctor advises it, take 1 low-dose aspirin a day to prevent heart attack. · Be safe with medicines. Take your medicines exactly as prescribed. Call your doctor if you think you are having a problem with your medicine. You will get more details on the specific medicines your doctor prescribes. Lifestyle changes  · Do not smoke. If you need help quitting, talk to your doctor about stop-smoking programs and medicines. These can increase your chances of quitting for good. · Eat a heart-healthy diet that is low in saturated fat and salt, and is high in fiber. Talk to your doctor or a dietitian about healthy eating. · Stay at a healthy weight. Or lose weight if you need to. Activity  · Talk to your doctor about a level of activity that is safe for you. · If an activity causes angina symptoms, stop and rest.  When should you call for help? Call 911 anytime you think you may need emergency care. For example, call if:  · You passed out (lost consciousness). · You have symptoms of a heart attack. These may include:  ¨ Chest pain or pressure, or a strange feeling in the chest.  ¨ Sweating. ¨ Shortness of breath. ¨ Nausea or vomiting. ¨ Pain, pressure, or a strange feeling in the back, neck, jaw, or upper belly or in one or both shoulders or arms. ¨ Lightheadedness or sudden weakness. ¨ A fast or irregular heartbeat. After you call 911, the  may tell you to chew 1 adult-strength or 2 to 4 low-dose aspirin. Wait for an ambulance. Do not try to drive yourself. · You have angina symptoms that do not go away with rest or are not getting better within 5 minutes after you take a dose of nitroglycerin. Call your doctor now or seek immediate medical care if:  · You are having angina symptoms more often than usual, or they are different or worse than usual.  · You feel dizzy or lightheaded, or you feel like you may faint. Watch closely for changes in your health, and be sure to contact your doctor if you have any problems.   Where can you learn more? Go to http://tamanna-darwin.info/. Enter H129 in the search box to learn more about \"Angina: Care Instructions. \"  Current as of: April 3, 2017  Content Version: 11.3  © 0570-4475 [employer]. Care instructions adapted under license by [employer] (which disclaims liability or warranty for this information). If you have questions about a medical condition or this instruction, always ask your healthcare professional. [address] any warranty or liability for your use of this information. Blood in the Urine: Care Instructions  Your Care Instructions  Blood in the urine, or hematuria, may make the urine look red, brown, or pink. There may be blood every time you urinate or just from time to time. You cannot always see blood in the urine, but it will show up in a urine test.  Blood in the urine may be serious. It should always be checked by a doctor. Your doctor may recommend more tests, including an X-ray, a CT scan, or a cystoscopy (which lets a doctor look inside the urethra and bladder). Blood in the urine can be a sign of another problem. Common causes are bladder infections and kidney stones. An injury to your groin or your genital area can also cause bleeding in the urinary tract. Very hard exercise--such as running a marathon--can cause blood in the urine. Blood in the urine can also be a sign of kidney disease or cancer in the bladder or kidney. Many cases of blood in the urine are caused by a harmless condition that runs in families. This is called benign familial hematuria. It does not need any treatment. Sometimes your urine may look red or brown even though it does not contain blood. For example, not getting enough fluids (dehydration), taking certain medicines, or having a liver problem can change the color of your urine.  Eating foods such as beets, rhubarb, or blackberries or foods with red food coloring can make your urine look red or pink. Follow-up care is a key part of your treatment and safety. Be sure to make and go to all appointments, and call your doctor if you are having problems. It's also a good idea to know your test results and keep a list of the medicines you take. When should you call for help? Call your doctor now or seek immediate medical care if:  · You have symptoms of a urinary infection. For example:  ¨ You have pus in your urine. ¨ You have pain in your back just below your rib cage. This is called flank pain. ¨ You have a fever, chills, or body aches. ¨ It hurts to urinate. ¨ You have groin or belly pain. · You have more blood in your urine. Watch closely for changes in your health, and be sure to contact your doctor if:  · You have new urination problems. · You do not get better as expected. Where can you learn more? Go to http://tamanna-darwin.info/. Enter S977 in the search box to learn more about \"Blood in the Urine: Care Instructions. \"  Current as of: March 20, 2017  Content Version: 11.3  © 7172-5225 [employer]. Care instructions adapted under license by [employer] (which disclaims liability or warranty for this information). If you have questions about a medical condition or this instruction, always ask your healthcare professional. [address] any warranty or liability for your use of this information. Patient armband removed and shredded  MyChart Activation    Thank you for requesting access to [employer]. Please follow the instructions below to securely access and download your online medical record. [employer] allows you to send messages to your doctor, view your test results, renew your prescriptions, schedule appointments, and more. How Do I Sign Up? 1. In your internet browser, go to [email or web address]  2. Click on the First Time User? Click Here link in the Sign In box.  You will be redirect to the New Member Sign Up page. 3. Enter your [employer] Access Code exactly as it appears below. You will not need to use this code after youve completed the sign-up process. If you do not sign up before the expiration date, you must request a new code. [employer] Access Code: [email or web address]  Expires: 10/16/2017 12:44 PM (This is the date your [employer] access code will )    4. Enter the last four digits of your Social Security Number (xxxx) and Date of Birth (mm/dd/yyyy) as indicated and click Submit. You will be taken to the next sign-up page. 5. Create a [employer] ID. This will be your [employer] login ID and cannot be changed, so think of one that is secure and easy to remember. 6. Create a [employer] password. You can change your password at any time. 7. Enter your Password Reset Question and Answer. This can be used at a later time if you forget your password. 8. Enter your e-mail address. You will receive e-mail notification when new information is available in [address] 9. Click Sign Up. You can now view and download portions of your medical record. 10. Click the Download Summary menu link to download a portable copy of your medical information. Additional Information    If you have questions, please visit the Frequently Asked Questions section of the [employer] website at [email or web address]/. Remember, [employer] is NOT to be used for urgent needs. For medical emergencies, dial 911.         DISCHARGE SUMMARY from Nurse    The following personal items are in your possession at time of discharge:    Dental Appliances: None  Visual Aid: Glasses, With patient        Jewelry: Ring     Other Valuables: Cell Phone             PATIENT INSTRUCTIONS:    After general anesthesia or intravenous sedation, for 24 hours or while taking prescription Narcotics:  · Limit your activities  · Do not drive and operate hazardous machinery  · Do not make important personal or business decisions  · Do  not drink alcoholic beverages  · If you have not urinated within 8 hours after discharge, please contact your surgeon on call. Report the following to your surgeon:  · Excessive pain, swelling, redness or odor of or around the surgical area  · Temperature over 100.5  · Nausea and vomiting lasting longer than 4 hours or if unable to take medications  · Any signs of decreased circulation or nerve impairment to extremity: change in color, persistent  numbness, tingling, coldness or increase pain  · Any questions        What to do at Home:  Recommended activity: Activity as tolerated    If you experience any of the following symptoms bleeding, blood in stools, blood in urine, chest pains, short of breath, fever greater than 100.5, nausea, vomiting, diarrhea, constipation please follow up with PCP. *  Please give a list of your current medications to your Primary Care Provider. *  Please update this list whenever your medications are discontinued, doses are      changed, or new medications (including over-the-counter products) are added. *  Please carry medication information at all times in case of emergency situations. These are general instructions for a healthy lifestyle:    No smoking/ No tobacco products/ Avoid exposure to second hand smoke    Surgeon General's Warning:  Quitting smoking now greatly reduces serious risk to your health. Obesity, smoking, and sedentary lifestyle greatly increases your risk for illness    A healthy diet, regular physical exercise & weight monitoring are important for maintaining a healthy lifestyle    You may be retaining fluid if you have a history of heart failure or if you experience any of the following symptoms:  Weight gain of 3 pounds or more overnight or 5 pounds in a week, increased swelling in our hands or feet or shortness of breath while lying flat in bed.   Please call your doctor as soon as you notice any of these symptoms; do not wait until your next office visit. Recognize signs and symptoms of STROKE:    F-face looks uneven    A-arms unable to move or move unevenly    S-speech slurred or non-existent    T-time-call 911 as soon as signs and symptoms begin-DO NOT go       Back to bed or wait to see if you get better-TIME IS BRAIN. Warning Signs of HEART ATTACK     Call 911 if you have these symptoms:   Chest discomfort. Most heart attacks involve discomfort in the center of the chest that lasts more than a few minutes, or that goes away and comes back. It can feel like uncomfortable pressure, squeezing, fullness, or pain.  Discomfort in other areas of the upper body. Symptoms can include pain or discomfort in one or both arms, the back, neck, jaw, or stomach.  Shortness of breath with or without chest discomfort.  Other signs may include breaking out in a cold sweat, nausea, or lightheadedness. Don't wait more than five minutes to call 911 - MINUTES MATTER! Fast action can save your life. Calling 911 is almost always the fastest way to get lifesaving treatment. Emergency Medical Services staff can begin treatment when they arrive -- up to an hour sooner than if someone gets to the hospital by car. The discharge information has been reviewed with the patient. The patient verbalized understanding. Discharge medications reviewed with the patient and appropriate educational materials and side effects teaching were provided.

## 2017-09-01 NOTE — PROGRESS NOTES
Pt transported home with a referral for home health services, for that area, referral made to [facility].

## 2017-09-01 NOTE — HOME CARE
Rec referral for [facility], but unable to provide service due to service area - we do not service zip code [address] - Perico Palacios RN

## 2017-09-01 NOTE — DIABETES MGMT
Diabetes Patient/Family Education Record    Factors That  May Influence Patients Ability  to Learn or  Comply With  Recommendations:    []   Language barrier    []   Cultural needs   []   Motivation    []   Cognitive limitation    []   Physical   [x]   Education    []   Physiological factors   []   Hearing/vision/speaking impairment   []   [employer] beliefs    []   Financial factors   []  Other:   []  No factors identified at this time.      Person Instructed:   [x]   Patient   [x]   Family-wife   []  Other     Preference for Learning:   [x]   Verbal   [x]   Written   []  Demonstration     Level of Comprehension & Competence:    []  Good                                      [x] Fair                                     []  Poor                             [x]  Needs Reinforcement   [x]  Teachback completed    Education Component:   [x]  Medication management,    [x]  Nutritional management including the role of carbohydrate intake   []  Exercise   []  Signs, symptoms, and treatment of hyperglycemia and hypoglycemia   [] Treatment of hyperglycemia and hypoglycemia   [x]  Importance of blood glucose monitoring and how to obtain a blood glucose meter    []  Instruction on use of blood glucose meter   [x]  Discuss the importance of HbA1C monitoring and provide patient with  results   []  Sick day guidelines   []  Proper use and disposal of lancets, needles, syringes or insulin pens (if appropriate)   []  Potential long-term complications (retinopathy, kidney disease, neuropathy, heart disease, stroke, vascular disease, foot care)   [x] Provide emergency contact number and contact number for more information    [x]  Goal:  Patient/family will demonstrate understanding of Diabetes Self Management Skills by: (date) 9/8/17_______  Plan for post-discharge education or self-management support:    [x] Outpatient class schedule provided            [] Patient Declined    [] Scheduled for outpatient classes (date) _______ Christ Purcell MS, [address] [place] [address], CDE  Pager: [phone]

## 2017-09-01 NOTE — PROGRESS NOTES
Progress Note    Patient: Rock Cancino MRN: [id number]  SSN: [id number]    YOB: 1955  Age: 58 y.o. Sex: male      Admit Date: 8/29/2017    LOS: 3 days     Subjective:     NO HEMATURIA OR  FLANK PAIN  FEELS GOOD  OFF XARELTO    Objective:     Vitals:    08/31/17 2010 09/01/17 0043 09/01/17 0439 09/01/17 0739   BP: 106/65 128/70 116/70 110/70   Pulse: 79 90 87 89   Resp: 18 18 18 18   Temp: 98.2 °F (36.8 °C) 97.7 °F (36.5 °C) 98 °F (36.7 °C) 98.6 °F (37 °C)   SpO2: 96% 96% 96% 96%   Weight:   200 lb 12.8 oz (91.1 kg)    Height:            Intake and Output:  Current Shift:    Last three shifts: 08/30 1901 - 09/01 0700  In: 1640 [P.O.:440; I.V.:1200]  Out: 2110 [Urine:2110]    Current Facility-Administered Medications   Medication Dose Route Frequency    lactated Ringers infusion  100 mL/hr IntraVENous CONTINUOUS    oxyCODONE-acetaminophen (PERCOCET) 5-325 mg per tablet 1 Tab  1 Tab Oral Q4H PRN    docusate sodium (COLACE) capsule 100 mg  100 mg Oral DAILY    ceFAZolin (ANCEF) 2g IVPB in 50 mL D5W  2 g IntraVENous ON CALL TO OR    morphine injection 2 mg  2 mg IntraVENous Q4H PRN    ondansetron (ZOFRAN) injection 4 mg  4 mg IntraVENous Q4H PRN    insulin lispro (HUMALOG) injection   SubCUTAneous AC&HS    famotidine (PF) (PEPCID) injection 20 mg  20 mg IntraVENous Q12H         Physical Exam:   GENERAL: alert, cooperative, no distress, appears stated age  ABDOMEN: soft, non-tender.  Bowel sounds normal. No masses,  no organomegaly  FLANK:  NON  TENDER      Lab/Data Review:  BMP: No results found for: NA, K, CL, CO2, AGAP, GLU, BUN, CREA, GFRAA, GFRNA  CMP: No results found for: NA, K, CL, CO2, AGAP, GLU, BUN, CREA, GFRAA, GFRNA, CA, MG, PHOS, ALB, TBIL, TP, ALB, GLOB, AGRAT, SGOT, ALT, GPT  CBC: No results found for: WBC, HGB, HGBEXT, HCT, HCTEXT, PLT, PLTEXT, HGBEXT, HCTEXT, PLTEXT       CT Results:    Results from Office Visit encounter on 07/18/17   CT CHEST ABD [address] CONT    US Results:  No results found for this or any previous visit.       Assessment:     Active Problems:    Kidney stones (7/19/2017)      Diabetes mellitus type 2, controlled ([address] (7/19/2017)      Acute blood loss anemia (8/29/2017)      Gross hematuria (8/29/2017)      CAD (coronary artery disease) (8/29/2017)        Plan:     COULD  START  XARELTO AS INDICATED AS LONG AS URINE REMAINS CLEAR  F/U NEXT 2  WKS WITH DR Emy Black        Signed By: Emily Levy MD , FACS    September 1, 2017      PAGER:  [address]:  [address]:  [phone] [facility]

## 2017-09-01 NOTE — DIABETES MGMT
GLYCEMIC CONTROL PLAN OF CARE    Assessment:  Pt is 58 yr old male admitted on 8/29/17 for evaluation of fatigue. Pt with past medical history significant for T2DM, Kidney stones, gross hematuria, CAD. Recommendations:  Diabetic education. Continue current diabetes medications. Will monitor inpatient for intervention. Most recent blood glucose values:     Ref.  Range 8/31/2017 11:35 8/31/2017 16:12 8/31/2017 21:11 9/1/2017 07:41   GLUCOSE,FAST - POC Latest Ref Range: 70 - 110 mg/dL 153 (H) 167 (H) 169 (H) 152 (H)     Current A1C:  5.9%-7/20/17 estimated average blood glucose of 123mg/dl over the past 2-3 months    Current hospital diabetes medications:  Correctional lispro ACHS- normal insulin sensitivity scale    Diet:   Diabetic consistent carbohydrate  Current Meal Intake:  Patient Vitals for the past 100 hrs:   % Diet Eaten   08/30/17 1128 100 %       Home diabetes medications:  Pt reports taking janumet xr with dinner daily    Goals:  Pt BG will be within target range by _9/4/17____    Education:  _x__  Refer to Diabetes Education Record             ___  Education not indicated at this time    Marcos Raphael [address], [address] [place] [address], [facility]  Pager: [phone]

## 2017-09-13 LAB
CA PHOS MFR STONE: 3 %
CALCIUM OXALATE DIHYDRATE MFR STONE IR: 10 %
COLOR STONE: NORMAL
COM MFR STONE: 87 %
COMMENT, 519994: NORMAL
COMPOSITION, 120440: NORMAL
DISCLAIMER, STO32L: NORMAL
NIDUS STONE QL: NORMAL
SIZE STONE: NORMAL MM
WT STONE: 90 MG

## 2017-09-23 NOTE — DISCHARGE SUMMARY
Discharge Summary    Patient: Alessandro Martinez MRN: [id number]  CSN: [id number]    YOB: 1955  Age: 58 y.o. Sex: male    DOA: 8/29/2017 LOS:  LOS: 3 days   Discharge Date: 9/1/2017     Admission Diagnoses:   Hematuria    Discharge Diagnoses:    Hematuria  Acute blood loss anemia s/p transfusion 2 units PRBCs prior to transfer  R UPJ stone s/p recent stent placement - now s/p laser lithotripsy with basket extraction and R ureteral stent exchange  LLE DVT on NOAC therapy (xarelto)  CAD hx     Discharge Condition: Stable    PHYSICAL EXAM  Visit Vitals    /70 (BP 1 Location: Right arm, BP Patient Position: At rest)    Pulse 88    Temp 97.9 °F (36.6 °C)    Resp 18    Ht 5' 11\" (1.803 m)    Wt 91.1 kg (200 lb 12.8 oz)    SpO2 96%    BMI 28.01 kg/m2       General: In NAD. HEENT: NCAT. Sclerae anicteric. EOMI. Lungs:  Clear, no wheezes. Effort nonlabored. Heart:  RRR. Abdomen: Soft, NTTP. Extremities: Warm, no ischemia or edema. Psych:   Good insight. Mood normal  Neurologic:  Awake and alert. Motor nonfocal.    Hospital Course:   See admission H&P for full details of HPI. Patient admitted to telemetry bed with urology in consultation. He was transfused 2 units PRBCs prior to transfer with appropriate response in H/H. Recommendation was for laser lithotripsy procedure and patient underwent this procedure with good result. PVL was done to assess RLE given history of recent DVT - results as documented below. Xarelto has been held since admission and recommendation is to continue to hold this for at least another 72 hours or so with plan to resume afterward if no bleeding issues develop in interim. Patient has remained stable post procedure and has is felt to be stable for discharge home with outpatient follow up as advised. Consults:   [facility]    Significant Diagnostic Studies:   ONEAL PVL:  INTERPRETATION/FINDINGS  Left leg :  1.  Chronic non-occlusive deep venous thrombosis identified in the  femoral and popliteal veins. 2. Deep veins visualized include the common femoral, femoral,  popliteal, posterior tibial and peroneal veins. 3. No evidence of superficial thrombosis detected. 4. Superficial veins visualized include the proximal great saphenous  vein. 5. No evidence of deep vein thrombosis in the contralateral common  femoral vein.     ADDITIONAL COMMENTS  Compared to previous exam on 7/20/17, there has been resolution of the   left common femoral thrombus and the thrombus of the left popliteal  and femoral veins is now chronic. XR retrograde pyelogram:  IMPRESSION:  1. Retrograde urography as noted. Double-J ureteral stent is in good position. Discharge Medications:     Discharge Medication List as of 9/1/2017 12:27 PM      START taking these medications    Details   oxyCODONE-acetaminophen (PERCOCET) 5-325 mg per tablet Take 1 Tab by mouth every four (4) hours as needed. Max Daily Amount: 6 Tabs., Print, Disp-20 Tab, R-0         CONTINUE these medications which have NOT CHANGED    Details   ergocalciferol (VITAMIN D2) 50,000 unit capsule Take 50,000 Units by mouth Every Mon, Wed & Sun. Indications: VITAMIN D DEFICIENCY (HIGH DOSE THERAPY), Historical Med      Omega-3-DHA-EPA-Fish Oil 1,000 mg (120 mg-180 mg) cap Take 1,000 mg by mouth two (2) times a day. Indications: cholesterol, Historical Med      fenofibrate (LOFIBRA) 160 mg tablet Historical Med      losartan (COZAAR) 25 mg tablet Historical Med      JANUMET XR 50-1,000 mg TM24 Historical Med, R-5, MITCH      tamsulosin (FLOMAX) 0.4 mg capsule Take 1 Cap by mouth daily (after dinner). , Normal, Disp-40 Cap, R-0         STOP taking these medications       rivaroxaban (XARELTO) 20 mg tab tablet Comments:   Reason for Stopping:               Activity: As tolerated    Diet: Cardiac Diet    Follow-up: with PCP, Sharona Cleveland MD in 1 week and cardiology as directed. Laurann Harada.  Nhan Kumari, [address] [place] [address] [address] [place] [address]

## 2018-07-27 ENCOUNTER — HOSPITAL ENCOUNTER (OUTPATIENT)
Dept: GENERAL RADIOLOGY | Age: 63
Discharge: HOME | End: 2018-07-27
Payer: COMMERCIAL

## 2018-07-27 DIAGNOSIS — R10.9 FLANK PAIN: ICD-10-CM

## 2018-07-27 PROCEDURE — 74018 RADEX ABDOMEN 1 VIEW: CPT

## 2020-08-07 PROBLEM — E78.5 HYPERLIPIDEMIA: Status: ACTIVE | Noted: 2019-01-16

## 2020-08-07 PROBLEM — I82.891 CHRONIC EMBOLISM AND THROMBOSIS OF OTHER SPECIFIED VEINS: Status: ACTIVE | Noted: 2019-01-16

## 2020-08-07 PROBLEM — E66.9 OBESITY: Status: ACTIVE | Noted: 2019-07-11

## 2020-08-07 PROBLEM — I11.9 HYPERTENSIVE HEART DISEASE WITHOUT HEART FAILURE: Status: ACTIVE | Noted: 2019-01-16

## 2020-08-07 PROBLEM — R60.9 SWELLING: Status: ACTIVE | Noted: 2019-07-11

## 2020-09-21 RX ORDER — SITAGLIPTIN AND METFORMIN HYDROCHLORIDE 50; 1000 MG/1; MG/1
TABLET, FILM COATED, EXTENDED RELEASE ORAL
Qty: 240 TAB | Refills: 1 | Status: SHIPPED | OUTPATIENT
Start: 2020-09-21 | End: 2021-05-12

## 2020-09-28 RX ORDER — EMPAGLIFLOZIN 10 MG/1
TABLET, FILM COATED ORAL
Qty: 90 TAB | Refills: 1 | Status: SHIPPED | OUTPATIENT
Start: 2020-09-28 | End: 2021-03-28

## 2020-10-30 RX ORDER — LOSARTAN POTASSIUM 25 MG/1
TABLET ORAL
Qty: 90 TAB | Refills: 1 | Status: SHIPPED | OUTPATIENT
Start: 2020-10-30 | End: 2021-04-25

## 2020-12-28 RX ORDER — FENOFIBRATE 160 MG/1
TABLET ORAL
Qty: 90 TAB | Refills: 1 | Status: SHIPPED | OUTPATIENT
Start: 2020-12-28 | End: 2021-06-20

## 2021-03-28 RX ORDER — EMPAGLIFLOZIN 10 MG/1
TABLET, FILM COATED ORAL
Qty: 90 TAB | Refills: 1 | Status: SHIPPED | OUTPATIENT
Start: 2021-03-28 | End: 2021-09-20

## 2021-04-24 DIAGNOSIS — I10 ESSENTIAL HYPERTENSION: Primary | ICD-10-CM

## 2021-04-25 RX ORDER — LOSARTAN POTASSIUM 25 MG/1
TABLET ORAL
Qty: 90 TAB | Refills: 1 | Status: SHIPPED | OUTPATIENT
Start: 2021-04-25 | End: 2021-04-28 | Stop reason: SDUPTHER

## 2021-04-28 ENCOUNTER — OFFICE VISIT (OUTPATIENT)
Dept: FAMILY MEDICINE CLINIC | Age: 66
End: 2021-04-28
Payer: MEDICARE

## 2021-04-28 VITALS
RESPIRATION RATE: 19 BRPM | HEART RATE: 88 BPM | WEIGHT: 199.8 LBS | BODY MASS INDEX: 27.06 KG/M2 | SYSTOLIC BLOOD PRESSURE: 129 MMHG | OXYGEN SATURATION: 96 % | HEIGHT: 72 IN | DIASTOLIC BLOOD PRESSURE: 78 MMHG

## 2021-04-28 DIAGNOSIS — Z79.4 CONTROLLED TYPE 2 DIABETES MELLITUS WITHOUT COMPLICATION, WITH LONG-TERM CURRENT USE OF INSULIN (HCC): ICD-10-CM

## 2021-04-28 DIAGNOSIS — E78.49 OTHER HYPERLIPIDEMIA: ICD-10-CM

## 2021-04-28 DIAGNOSIS — Z00.00 MEDICARE ANNUAL WELLNESS VISIT, SUBSEQUENT: ICD-10-CM

## 2021-04-28 DIAGNOSIS — E11.9 CONTROLLED TYPE 2 DIABETES MELLITUS WITHOUT COMPLICATION, WITH LONG-TERM CURRENT USE OF INSULIN (HCC): ICD-10-CM

## 2021-04-28 DIAGNOSIS — I10 ESSENTIAL HYPERTENSION: ICD-10-CM

## 2021-04-28 DIAGNOSIS — Z13.31 SCREENING FOR DEPRESSION: ICD-10-CM

## 2021-04-28 DIAGNOSIS — Z12.11 ENCOUNTER FOR SCREENING COLONOSCOPY: ICD-10-CM

## 2021-04-28 DIAGNOSIS — I82.891 CHRONIC EMBOLISM AND THROMBOSIS OF OTHER SPECIFIED VEINS: ICD-10-CM

## 2021-04-28 DIAGNOSIS — Z79.4 CONTROLLED TYPE 2 DIABETES MELLITUS WITHOUT COMPLICATION, WITH LONG-TERM CURRENT USE OF INSULIN (HCC): Primary | ICD-10-CM

## 2021-04-28 DIAGNOSIS — N20.0 RENAL STONES: ICD-10-CM

## 2021-04-28 DIAGNOSIS — Z13.39 SCREENING FOR ALCOHOLISM: ICD-10-CM

## 2021-04-28 DIAGNOSIS — E11.9 CONTROLLED TYPE 2 DIABETES MELLITUS WITHOUT COMPLICATION, WITH LONG-TERM CURRENT USE OF INSULIN (HCC): Primary | ICD-10-CM

## 2021-04-28 PROBLEM — I25.10 CAD (CORONARY ARTERY DISEASE): Status: RESOLVED | Noted: 2017-08-29 | Resolved: 2021-04-28

## 2021-04-28 PROCEDURE — G8752 SYS BP LESS 140: HCPCS | Performed by: FAMILY MEDICINE

## 2021-04-28 PROCEDURE — 3017F COLORECTAL CA SCREEN DOC REV: CPT | Performed by: FAMILY MEDICINE

## 2021-04-28 PROCEDURE — G8536 NO DOC ELDER MAL SCRN: HCPCS | Performed by: FAMILY MEDICINE

## 2021-04-28 PROCEDURE — G8754 DIAS BP LESS 90: HCPCS | Performed by: FAMILY MEDICINE

## 2021-04-28 PROCEDURE — G8510 SCR DEP NEG, NO PLAN REQD: HCPCS | Performed by: FAMILY MEDICINE

## 2021-04-28 PROCEDURE — 2022F DILAT RTA XM EVC RTNOPTHY: CPT | Performed by: FAMILY MEDICINE

## 2021-04-28 PROCEDURE — 1101F PT FALLS ASSESS-DOCD LE1/YR: CPT | Performed by: FAMILY MEDICINE

## 2021-04-28 PROCEDURE — G8427 DOCREV CUR MEDS BY ELIG CLIN: HCPCS | Performed by: FAMILY MEDICINE

## 2021-04-28 PROCEDURE — G0439 PPPS, SUBSEQ VISIT: HCPCS | Performed by: FAMILY MEDICINE

## 2021-04-28 PROCEDURE — G8419 CALC BMI OUT NRM PARAM NOF/U: HCPCS | Performed by: FAMILY MEDICINE

## 2021-04-28 PROCEDURE — 99213 OFFICE O/P EST LOW 20 MIN: CPT | Performed by: FAMILY MEDICINE

## 2021-04-28 PROCEDURE — 3046F HEMOGLOBIN A1C LEVEL >9.0%: CPT | Performed by: FAMILY MEDICINE

## 2021-04-28 RX ORDER — ROSUVASTATIN CALCIUM 10 MG/1
10 TABLET, COATED ORAL
COMMUNITY

## 2021-04-28 RX ORDER — PANTOPRAZOLE SODIUM 40 MG/1
20 TABLET, DELAYED RELEASE ORAL DAILY
COMMUNITY

## 2021-04-28 RX ORDER — LOSARTAN POTASSIUM 25 MG/1
TABLET ORAL
Qty: 90 TAB | Refills: 1 | Status: SHIPPED | OUTPATIENT
Start: 2021-04-28

## 2021-04-28 RX ORDER — LANOLIN ALCOHOL/MO/W.PET/CERES
500 CREAM (GRAM) TOPICAL DAILY
COMMUNITY

## 2021-04-28 NOTE — PATIENT INSTRUCTIONS
Medicare Wellness Visit, Male    The best way to live healthy is to have a lifestyle where you eat a well-balanced diet, exercise regularly, limit alcohol use, and quit all forms of tobacco/nicotine, if applicable. Regular preventive services are another way to keep healthy. Preventive services (vaccines, screening tests, monitoring & exams) can help personalize your care plan, which helps you manage your own care. Screening tests can find health problems at the earliest stages, when they are easiest to treat. [facility] follows the current, evidence-based guidelines published by the [facility] ([facility]) when recommending preventive services for our patients. Because we follow these guidelines, sometimes recommendations change over time as research supports it. (For example, a prostate screening blood test is no longer routinely recommended for men with no symptoms). Of course, you and your doctor may decide to screen more often for some diseases, based on your risk and co-morbidities (chronic disease you are already diagnosed with). Preventive services for you include:  - Medicare offers their members a free annual wellness visit, which is time for you and your primary care provider to discuss and plan for your preventive service needs. Take advantage of this benefit every year!  -All adults over age 72 should receive the recommended pneumonia vaccines. Current USPSTF guidelines recommend a series of two vaccines for the best pneumonia protection.   -All adults should have a flu vaccine yearly and tetanus vaccine every 10 years.  -All adults age 48 and older should receive the shingles vaccines (series of two vaccines).        -All adults age 38-68 who are overweight should have a diabetes screening test once every three years.   -Other screening tests & preventive services for persons with diabetes include: an eye exam to screen for diabetic retinopathy, a kidney function test, a foot exam, and stricter control over your cholesterol.   -Cardiovascular screening for adults with routine risk involves an electrocardiogram (ECG) at intervals determined by the provider.   -Colorectal cancer screening should be done for adults age 54-65 with no increased risk factors for colorectal cancer. There are a number of acceptable methods of screening for this type of cancer. Each test has its own benefits and drawbacks. Discuss with your provider what is most appropriate for you during your annual wellness visit. The different tests include: colonoscopy (considered the best screening method), a fecal occult blood test, a fecal DNA test, and sigmoidoscopy.  -All adults born between [facility] should be screened once for Hepatitis C.  -An Abdominal Aortic Aneurysm (AAA) Screening is recommended for men age 73-68 who has ever smoked in their lifetime.      Here is a list of your current Health Maintenance items (your personalized list of preventive services) with a due date:  Health Maintenance Due   Topic Date Due    Hepatitis C Test  Never done    Diabetic Foot Care  Never done    Albumin Urine Test  Never done    Eye Exam  Never done    COVID-19 Vaccine (1) Never done    DTaP/Tdap/Td  (1 - Tdap) Never done    Shingles Vaccine (1 of 2) Never done    Colorectal Screening  Never done    Hemoglobin A1C    07/20/2018    Pneumococcal Vaccine (1 of 1 - PPSV23) Never done    Cholesterol Test   06/27/2020

## 2021-04-28 NOTE — PROGRESS NOTES
This is the Subsequent Medicare Annual Wellness Exam, performed 12 months or more after the Initial AWV or the last Subsequent AWV    I have reviewed the patient's medical history in detail and updated the computerized patient record. Assessment/Plan   Education and counseling provided:  Are appropriate based on today's review and evaluation    1. Medicare annual wellness visit, subsequent  2. Screening for alcoholism  3. Screening for depression  -     DEPRESSION SCREEN ANNUAL       Depression Risk Factor Screening     3 most recent PHQ Screens 4/28/2021   Little interest or pleasure in doing things Not at all   Feeling down, depressed, irritable, or hopeless Not at all   Total Score PHQ 2 0       Alcohol Risk Screen    Do you average more than 1 drink per night or more than 7 drinks a week: No    In the past three months have you have had more than 4 drinks containing alcohol on one occasion: No        Functional Ability and Level of Safety    Hearing: Hearing is good. Activities of Daily Living: The home contains: handrails and grab bars  Patient does total self care      Ambulation: with no difficulty     Fall Risk:  Fall Risk Assessment, last 12 mths 4/28/2021   Able to walk? Yes   Fall in past 12 months? 0   Do you feel unsteady?  0   Are you worried about falling 0      Abuse Screen:  Patient is not abused       Cognitive Screening    Has your family/caregiver stated any concerns about your memory: no       Health Maintenance Due     Health Maintenance Due   Topic Date Due    Hepatitis C Screening  Never done    Foot Exam Q1  Never done    MICROALBUMIN Q1  Never done    Eye Exam Retinal or Dilated  Never done    COVID-19 Vaccine (1) Never done    DTaP/Tdap/Td series (1 - Tdap) Never done    Shingrix Vaccine Age 50> (1 of 2) Never done    Colorectal Cancer Screening Combo  Never done    A1C test (Diabetic or Prediabetic)  07/20/2018    Pneumococcal 65+ years (1 of 1 - PPSV23) Never done  Lipid Screen  06/27/2020       Patient Care Team   Patient Care Team:  Stanford Kelly MD as PCP - General (Family Medicine)  Stanford Kelly MD as PCP - [address] [place] [address] Provider  Candy Waite MD as Physician (Cardio Vascular Surgery)    History     Patient Active Problem List   Diagnosis Code    Kidney stones N20.0    Diabetes mellitus type 2, controlled ([facility] [address] E11.9    Renal stones N20.0    Acute blood loss anemia D62    Gross hematuria R31.0    CAD (coronary artery disease) I25.10    Hypertensive heart disease without heart failure I11.9    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 I82.891    Hyperlipidemia E78.5    Swelling R60.9    Obesity E66.9     Past Medical History:   Diagnosis Date    Cataract     right eye    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 1/16/2019    Diabetes ([facility] [address] [facility]     Gall stones     Hematuria     Hyperlipidemia 1/16/2019    Hypertension     Hypertensive heart disease without heart failure 1/16/2019    Kidney stone     Obesity 7/11/2019    Swelling 7/11/2019      Past Surgical History:   Procedure Laterality Date    HX COLONOSCOPY      HX OTHER SURGICAL      knee surgery     Current Outpatient Medications   Medication Sig Dispense Refill    losartan (COZAAR) 25 mg tablet take 1 tablet by mouth once daily 90 Tab 1    Jardiance 10 mg tablet take 1 tablet by mouth once daily 90 Tab 1    fenofibrate (LOFIBRA) 160 mg tablet take 1 tablet by mouth once daily with meals 90 Tab 1    sildenafiL, pulmonary hypertension, (REVATIO) 20 mg tablet TAKE 3 TABLETS BY MOUTH 1 HOUR PRIOR TO SEXUAL ACTIVITY OR TAKE 5 TABS 1 HOUR PRIOR TO SEXUAL ACTIVITY IF NO IMPROVEMENT WITH 3 TABS 20 Tab 0    Janumet XR 50-1,000 mg TM24 take 2 tablets by mouth daily (NEED APPOINTMENT FOR REFILLS) 240 Tab 1    omeprazole (PRILOSEC) 40 mg capsule   0    trimethoprim-sulfamethoxazole (BACTRIM DS, SEPTRA DS) 160-800 mg per tablet       b complex-c-min-fe-fa 106mg-1 mg (FERROCITE PLUS) 106 mg iron- 1 mg tab Take 1 Tab by mouth daily.  tamsulosin (FLOMAX) 0.4 mg capsule Take 1 Cap by mouth daily (after dinner). 90 Cap 3    ergocalciferol (VITAMIN D2) 50,000 unit capsule Take 50,000 Units by mouth Every Mon, Wed & Sun. Indications: VITAMIN D DEFICIENCY (HIGH DOSE THERAPY)      Omega-3-DHA-EPA-Fish Oil 1,000 mg (120 mg-180 mg) cap Take 1,000 mg by mouth two (2) times a day.  Indications: cholesterol       No Known Allergies    Family History   Problem Relation Age of Onset    Colon Cancer Mother      Social History     Tobacco Use    Smoking status: Never Smoker    Smokeless tobacco: Never Used   Substance Use Topics    Alcohol use: No         [place]

## 2021-04-29 ENCOUNTER — HOSPITAL ENCOUNTER (OUTPATIENT)
Dept: LAB | Age: 66
Discharge: HOME | End: 2021-04-29
Payer: COMMERCIAL

## 2021-04-29 LAB
ALBUMIN SERPL-MCNC: 4.3 G/DL (ref 3.5–4.7)
ALBUMIN/GLOB SERPL: 1.4 {RATIO}
ALP SERPL-CCNC: 54 U/L (ref 38–126)
ALT SERPL-CCNC: 29 U/L (ref 3–72)
ANION GAP SERPL CALC-SCNC: 10 MMOL/L
AST SERPL W P-5'-P-CCNC: 24 U/L (ref 17–74)
BILIRUB SERPL-MCNC: 1.2 MG/DL (ref 0.2–1)
BUN SERPL-MCNC: 21 MG/DL (ref 9–21)
BUN/CREAT SERPL: 18
CA-I BLD-MCNC: 10.7 MG/DL (ref 8.5–10.5)
CHLORIDE SERPL-SCNC: 106 MMOL/L (ref 94–111)
CHOLEST SERPL-MCNC: 115 MG/DL
CO2 SERPL-SCNC: 24 MMOL/L (ref 21–33)
CREAT SERPL-MCNC: 1.2 MG/DL (ref 0.8–1.5)
ERYTHROCYTE [DISTWIDTH] IN BLOOD BY AUTOMATED COUNT: 14.2 % (ref 11.6–14.5)
GLOBULIN SER CALC-MCNC: 3.1 G/DL
GLUCOSE SERPL-MCNC: 141 MG/DL (ref 70–110)
HCT VFR BLD AUTO: 49 % (ref 36–48)
HDLC SERPL-MCNC: 42 MG/DL
HDLC SERPL: 2.7 {RATIO} (ref 0–5)
HGB BLD-MCNC: 16.8 G/DL (ref 13–16)
LDLC SERPL CALC-MCNC: 40.6 MG/DL (ref 0–100)
LIPID PROFILE,FLP: ABNORMAL
MCH RBC QN AUTO: 29.3 PG (ref 24–34)
MCHC RBC AUTO-ENTMCNC: 34.3 G/DL (ref 31–37)
MCV RBC AUTO: 85.5 FL (ref 74–97)
PLATELET # BLD AUTO: 217 K/UL (ref 135–420)
PMV BLD AUTO: 10.6 FL (ref 9.2–11.8)
POTASSIUM SERPL-SCNC: 3.9 MMOL/L (ref 3.2–5.1)
PROT SERPL-MCNC: 7.4 G/DL (ref 6.1–8.4)
RBC # BLD AUTO: 5.73 M/UL (ref 4.7–5.5)
SODIUM SERPL-SCNC: 140 MMOL/L (ref 135–145)
TRIGL SERPL-MCNC: 162 MG/DL (ref ?–150)
VLDLC SERPL CALC-MCNC: 32.4 MG/DL
WBC # BLD AUTO: 7.4 K/UL (ref 4.6–13.2)

## 2021-04-29 PROCEDURE — 83036 HEMOGLOBIN GLYCOSYLATED A1C: CPT

## 2021-04-29 PROCEDURE — 80061 LIPID PANEL: CPT

## 2021-04-29 PROCEDURE — 36415 COLL VENOUS BLD VENIPUNCTURE: CPT

## 2021-04-29 PROCEDURE — 85027 COMPLETE CBC AUTOMATED: CPT

## 2021-04-29 PROCEDURE — 82570 ASSAY OF URINE CREATININE: CPT

## 2021-04-29 PROCEDURE — 80053 COMPREHEN METABOLIC PANEL: CPT

## 2021-04-29 NOTE — PROGRESS NOTES
Subjective:   Talon Mayo is a 77 y.o. male who was seen for Annual Wellness Visit    HPI the patient is a 80-year-old male who is seen for Medicare wellness exam he has had chronic venous embolism diabetes hypertension and hyperlipidemia he has not had an A1c in quite a time. He has had no falls or injuries he needs blood work done he denies any chest pain or shortness of breath. He has been eating very well he has a history of anemia as well. He has had no falls or injuries he eats appropriately. He tells me his colonoscopy is up-to-date but it is not on our chart he is seeing a different doctor who checks his prostate and we do not need to do that. He takes blood sugar medicine he takes cholesterol medication he needs medications refill on labs today as well. He denies being anxious or depressed. He sleeps relatively well he lives with his wife in a very stable relationship    Home Medications    Medication Sig Start Date End Date Taking? Authorizing Provider   rosuvastatin (CRESTOR) 10 mg tablet Take 10 mg by mouth nightly. Yes Provider, Historical   pantoprazole (PROTONIX) 40 mg tablet Take 20 mg by mouth daily. Yes Provider, Historical   cyanocobalamin (VITAMIN B12) 500 mcg tablet Take 500 mcg by mouth daily.    Yes Provider, Historical   glucose blood VI test strips (OneTouch Ultra Blue Test Strip) strip Use to check blood sugar 2 times daily 4/28/21  Yes Sena Ballard MD   losartan (COZAAR) 25 mg tablet take 1 tablet by mouth once daily 4/28/21  Yes Sena Ballard MD   Jardiance 10 mg tablet take 1 tablet by mouth once daily 3/28/21  Yes Sena Ballard MD   fenofibrate (LOFIBRA) 160 mg tablet take 1 tablet by mouth once daily with meals 12/28/20  Yes Sena Ballard MD   Janumet XR 50-1,000 mg TM24 take 2 tablets by mouth daily (NEED APPOINTMENT FOR REFILLS) 9/21/20  Yes Sena Ballard MD   b complex-c-min-fe-fa 106mg-1 mg (FERROCITE PLUS) 106 mg iron- 1 mg tab Take 1 Tab by mouth daily. Yes Provider, Historical   Omega-3-DHA-EPA-Fish Oil 1,000 mg (120 mg-180 mg) cap Take 1,000 mg by mouth two (2) times a day. Indications: cholesterol 8/29/17  Yes Provider, Historical   ergocalciferol (VITAMIN D2) 50,000 unit capsule Take 50,000 Units by mouth Every Mon, Wed & Sun. 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 HENT: Negative. Eyes: Negative. Respiratory: Negative. Cardiovascular: Negative. Gastrointestinal: Negative. Endocrine: Negative. Genitourinary: Negative. Musculoskeletal: Negative. Skin: Negative. Allergic/Immunologic: Negative. Neurological: Negative. Hematological: Negative. Psychiatric/Behavioral: Negative.          Physical Exam   Objective:     Visit Vitals  /78 (BP 1 Location: Right upper arm, BP Patient Position: Sitting, BP Cuff Size: Adult)   Pulse 88   Resp 19   Ht 5' 11.5\" (1.816 m)   Wt 199 lb 12.8 oz (90.6 kg)   SpO2 96%   BMI 27.48 kg/m²      General: alert, cooperative, no distress   Mental  status: normal mood, behavior, speech, dress, motor activity, and thought processes, able to follow commands   HENT: NCAT   Neck: no visualized mass   Resp: no respiratory distress   Neuro: no gross deficits Skin: no discoloration or lesions of concern on visible areas   Psychiatric: normal affect, consistent with stated mood, no evidence of hallucinations   There is some generalized osteoarthritis. The pupils are reactive he has a regular rate and rhythm the abdomen is benign the extremities are clear he is pleasant and cooperative in no significant distress. No rashes are noted no edema. He has generalized osteoarthritis he is not clinically anxious or depressed. His gait seems to be appropriate. I did not do a rectal exam because he is seen by urology. Assessment & Plan:     Medicare wellness exam, hypertension, hyperlipidemia, diabetes mellitus type 2, screening for depression and alcohol, hypertension: I am going to get a metabolic panel I am going to get a CBC he needs a lipid panel as well. I am going to check his hemoglobin A1c. His other medications will be refilled and are up-to-date. He seems to be emotionally stable. We will follow in 6 months or sooner if needed. This was a 25-minute visit. Greater than half the time was in consultation. I spent at least 23 minutes on this visit with this established patient. [phone]    Additional exam findings: We discussed the expected course, resolution and complications of the diagnosis(es) in detail. Medication risks, benefits, costs, interactions, and alternatives were discussed as indicated. I advised him to contact the office if his condition worsens, changes or fails to improve as anticipated. He expressed understanding with the diagnosis(es) and plan.

## 2021-04-30 LAB
CREAT UR-MCNC: 124 MG/DL
EST. AVERAGE GLUCOSE BLD GHB EST-MCNC: 131 MG/DL
HBA1C MFR BLD: 6.2 % (ref 4–5.6)
MICROALBUMIN UR-MCNC: 2.37 MG/DL
MICROALBUMIN/CREAT UR-RTO: 19 MGMALB/GCRE (ref 0–30)

## 2021-05-04 ENCOUNTER — TELEPHONE (OUTPATIENT)
Dept: FAMILY MEDICINE CLINIC | Age: 66
End: 2021-05-04

## 2021-05-12 RX ORDER — SITAGLIPTIN AND METFORMIN HYDROCHLORIDE 50; 1000 MG/1; MG/1
TABLET, FILM COATED, EXTENDED RELEASE ORAL
Qty: 240 TAB | Refills: 1 | Status: SHIPPED | OUTPATIENT
Start: 2021-05-12

## 2021-06-20 RX ORDER — FENOFIBRATE 160 MG/1
TABLET ORAL
Qty: 90 TABLET | Refills: 1 | Status: SHIPPED | OUTPATIENT
Start: 2021-06-20

## 2021-08-03 PROBLEM — N20.0 KIDNEY STONES: Status: RESOLVED | Noted: 2017-07-19 | Resolved: 2021-08-03

## 2021-09-20 RX ORDER — EMPAGLIFLOZIN 10 MG/1
TABLET, FILM COATED ORAL
Qty: 90 TABLET | Refills: 1 | Status: SHIPPED | OUTPATIENT
Start: 2021-09-20

## 2022-03-18 PROBLEM — E66.9 OBESITY: Status: ACTIVE | Noted: 2019-07-11

## 2022-03-18 PROBLEM — E11.9 DIABETES MELLITUS TYPE 2, CONTROLLED (HCC): Status: ACTIVE | Noted: 2017-07-19

## 2022-03-18 PROBLEM — D62 ACUTE BLOOD LOSS ANEMIA: Status: ACTIVE | Noted: 2017-08-29

## 2022-03-19 PROBLEM — I11.9 HYPERTENSIVE HEART DISEASE WITHOUT HEART FAILURE: Status: ACTIVE | Noted: 2019-01-16

## 2022-03-19 PROBLEM — R31.0 GROSS HEMATURIA: Status: ACTIVE | Noted: 2017-08-29

## 2022-03-19 PROBLEM — R60.9 SWELLING: Status: ACTIVE | Noted: 2019-07-11

## 2022-03-20 PROBLEM — I10 ESSENTIAL HYPERTENSION: Status: ACTIVE | Noted: 2021-04-28

## 2022-03-20 PROBLEM — Z13.31 SCREENING FOR DEPRESSION: Status: ACTIVE | Noted: 2021-04-28

## 2022-03-20 PROBLEM — I82.891 CHRONIC EMBOLISM AND THROMBOSIS OF OTHER SPECIFIED VEINS: Status: ACTIVE | Noted: 2019-01-16

## 2022-03-20 PROBLEM — E78.5 HYPERLIPIDEMIA: Status: ACTIVE | Noted: 2019-01-16

## 2022-03-20 PROBLEM — N20.0 RENAL STONES: Status: ACTIVE | Noted: 2017-07-19

## 2022-09-21 NOTE — PROGRESS NOTES
PT/OT [facility] EVALUATION CALLED AND PLACED IN THE REFERRAL QUE FOR SERVICES. SPOKE WITH CARLOS BUTT REGARDING THIS REFERRAL. Detail Level: Simple Render Risk Assessment In Note?: no Comment: Pt has scaly well demarcated plaques on b/l palms. Nail pitting and discoloration of multiple nails. No pustules noted. Patient counseled on applying clobetasol for two weeks and instructed to wear gloves when washing dishes or doing work with hands. Comment: Toenail clippings were taken today and will be sent to pathology lab to determine final diagnosis. Patient will also have labs drawn to determine if oral antifungals are a safe course of treatment. Comment: A curette was use to remove surface area of the wart and the remaining area was treated with LN2. Comment: Patient showed pain and tenderness when touching inflamed areas. Patient will being 7 day course of cephalexin and follow up if sxs do not regress. No

## 2022-10-16 NOTE — PROGRESS NOTES
Bedside and Verbal shift change report given to edwin gonzales rn (oncoming nurse) by Kimberlee Bradley rn (offgoing nurse). Report included the following information SBAR, Kardex and MAR. Pt.on high flow nasal ssodnp38/30,lungs sound coarse,encourage to use IS.VSS,afebrile,abd.distended,regular diet-tolerating well. Continues on lasix IVP with good results. Denies pain  Problem: Discharge Planning  Goal: Discharge to home or other facility with appropriate resources  Outcome: Progressing

## 2023-02-06 ENCOUNTER — TRANSCRIBE ORDER (OUTPATIENT)
Dept: REGISTRATION | Age: 68
End: 2023-02-06

## 2023-02-06 ENCOUNTER — HOSPITAL ENCOUNTER (OUTPATIENT)
Dept: LAB | Age: 68
Discharge: HOME | End: 2023-02-06
Payer: MEDICARE

## 2023-02-06 DIAGNOSIS — N40.1 ENLARGED PROSTATE WITH URINARY OBSTRUCTION: ICD-10-CM

## 2023-02-06 DIAGNOSIS — N13.8 ENLARGED PROSTATE WITH URINARY OBSTRUCTION: Primary | ICD-10-CM

## 2023-02-06 DIAGNOSIS — N13.8 ENLARGED PROSTATE WITH URINARY OBSTRUCTION: ICD-10-CM

## 2023-02-06 DIAGNOSIS — N40.1 ENLARGED PROSTATE WITH URINARY OBSTRUCTION: Primary | ICD-10-CM

## 2023-02-06 PROCEDURE — 87086 URINE CULTURE/COLONY COUNT: CPT

## 2023-02-07 LAB
BACTERIA SPEC CULT: NORMAL
COLONY COUNT,CNT: NORMAL
SPECIAL REQUESTS,SREQ: NORMAL

## 2023-06-21 ENCOUNTER — HOSPITAL ENCOUNTER (OUTPATIENT)
Age: 68
Discharge: HOME | End: 2023-06-24
Payer: MEDICARE

## 2023-06-21 DIAGNOSIS — E83.52 HYPERCALCEMIA: ICD-10-CM

## 2023-06-21 LAB
CA-I BLD-MCNC: 10.9 MG/DL (ref 8.5–10.1)
PTH-INTACT SERPL-MCNC: 64.2 PG/ML (ref 18.4–88)

## 2023-06-21 PROCEDURE — 83970 ASSAY OF PARATHORMONE: CPT

## 2023-06-21 PROCEDURE — 36415 COLL VENOUS BLD VENIPUNCTURE: CPT

## 2023-10-06 ENCOUNTER — HOSPITAL ENCOUNTER (OUTPATIENT)
Age: 68
End: 2023-10-06
Payer: MEDICARE

## 2023-10-06 DIAGNOSIS — N13.8 BPH WITH OBSTRUCTION/LOWER URINARY TRACT SYMPTOMS: ICD-10-CM

## 2023-10-06 DIAGNOSIS — N40.1 BPH WITH OBSTRUCTION/LOWER URINARY TRACT SYMPTOMS: ICD-10-CM

## 2023-10-06 LAB — PSA SERPL-MCNC: 0.5 NG/ML (ref 0–4)

## 2023-10-06 PROCEDURE — 84153 ASSAY OF PSA TOTAL: CPT

## 2023-10-06 PROCEDURE — 36415 COLL VENOUS BLD VENIPUNCTURE: CPT

## (undated) DEVICE — 3M™ BAIR PAWS FLEX™ WARMING GOWN, STANDARD, 20 PER CASE 81003: Brand: BAIR PAWS™

## (undated) DEVICE — SKIN MARKER,REGULAR TIP WITH RULER AND LABELS: Brand: DEVON

## (undated) DEVICE — Z DUP USE 2565107 PACK SURG PROC LEG CYSTO T-DRAPE REINF TBL CVR HND TWL

## (undated) DEVICE — MEDI-VAC NON-CONDUCTIVE SUCTION TUBING: Brand: CARDINAL HEALTH

## (undated) DEVICE — STER SINGLE BASIN SET W/BOWLS: Brand: CARDINAL HEALTH

## (undated) DEVICE — TRAY PREP DRY W/ PREM GLV 2 APPL 6 SPNG 2 UNDPD 1 OVERWRAP

## (undated) DEVICE — OPEN-END FLEXI-TIP URETERAL CATHETER: Brand: FLEXI-TIP

## (undated) DEVICE — STRAP CATH CTH-SECUR UNIV 2IN --

## (undated) DEVICE — STERILE POLYISOPRENE POWDER-FREE SURGICAL GLOVES: Brand: PROTEXIS

## (undated) DEVICE — BAG DRAINAGE CUST DISP

## (undated) DEVICE — SEAL INSTR CYSTO ADJ BX PRT SEAL FOR ACC UP TO 6FR

## (undated) DEVICE — KIT CLN UP BON SECOURS MARYV

## (undated) DEVICE — GOWN,PREVENTION PLUS,XLN/XL,ST,24/CS: Brand: MEDLINE

## (undated) DEVICE — SOLUTION IRRIG 1000ML H2O STRL BLT

## (undated) DEVICE — FLEX ADVANTAGE 3000CC: Brand: FLEX ADVANTAGE

## (undated) DEVICE — GAUZE SPONGES,16 PLY: Brand: CURITY

## (undated) DEVICE — (D)SYR 10ML 1/5ML GRAD NSAF -- PKGING CHANGE USE ITEM 338027

## (undated) DEVICE — BAG DRAIN URIN 2000ML LF STRL -- CONVERT TO ITEM 363123

## (undated) DEVICE — GDWIRE 3CM FLX-TIP 0.038X150CM -- BX/5 SENSOR

## (undated) DEVICE — Y-TYPE TUR/BLADDER IRRIGATION SET, REGULATING CLAMP

## (undated) DEVICE — SOLUTION IRRIG 3000ML 0.9% SOD CHL FLX CONT 0797208] ICU MEDICAL INC]

## (undated) DEVICE — LUB SURG MEDC STRL 2OZ TUBE MC -- MEDICHOICE

## (undated) DEVICE — NITINOL STONE RETRIEVAL BASKET: Brand: ZERO TIP

## (undated) DEVICE — SHEATH URET 36CM OD15FR ID13FR HYDRPHLC 2 TAPR NAVIGATOR

## (undated) DEVICE — URETERAL ACCESS SHEATH SET: Brand: NAVIGATOR

## (undated) DEVICE — CATHETER URETH 20FR BLLN 30CC 2 W F INF CTRL BARDX

## (undated) DEVICE — TUBING IRRIG L77IN DIA0.241IN L BOR FOR CYSTO W/ NVENT

## (undated) DEVICE — SOLUTION IRRIG 3000ML H2O STRL BAG

## (undated) DEVICE — SOLUTION IV 1000ML 0.9% SOD CHL